# Patient Record
Sex: MALE | Race: WHITE
[De-identification: names, ages, dates, MRNs, and addresses within clinical notes are randomized per-mention and may not be internally consistent; named-entity substitution may affect disease eponyms.]

---

## 2018-11-09 ENCOUNTER — HOSPITAL ENCOUNTER (EMERGENCY)
Dept: HOSPITAL 84 - D.ER | Age: 78
Discharge: OTHER INSTITUTION | End: 2018-11-09
Payer: OTHER GOVERNMENT

## 2018-11-09 VITALS — WEIGHT: 140.29 LBS | HEIGHT: 72 IN | BODY MASS INDEX: 19 KG/M2

## 2018-11-09 VITALS — DIASTOLIC BLOOD PRESSURE: 52 MMHG | SYSTOLIC BLOOD PRESSURE: 101 MMHG

## 2018-11-09 DIAGNOSIS — A41.9: Primary | ICD-10-CM

## 2018-11-09 DIAGNOSIS — J18.9: ICD-10-CM

## 2018-11-09 DIAGNOSIS — R05: ICD-10-CM

## 2018-11-09 DIAGNOSIS — I48.91: ICD-10-CM

## 2018-11-09 LAB
ALBUMIN SERPL-MCNC: 4 G/DL (ref 3.4–5)
ALP SERPL-CCNC: 106 U/L (ref 46–116)
ALT SERPL-CCNC: 17 U/L (ref 10–68)
ANION GAP SERPL CALC-SCNC: 12.7 MMOL/L (ref 8–16)
APTT BLD: 30.8 SECONDS (ref 22.8–39.4)
BILIRUB SERPL-MCNC: 1.11 MG/DL (ref 0.2–1.3)
BUN SERPL-MCNC: 17 MG/DL (ref 7–18)
CALCIUM SERPL-MCNC: 9.5 MG/DL (ref 8.5–10.1)
CHLORIDE SERPL-SCNC: 101 MMOL/L (ref 98–107)
CK MB SERPL-MCNC: 0.9 U/L (ref 0–3.6)
CK SERPL-CCNC: 40 UL (ref 21–232)
CO2 SERPL-SCNC: 30.1 MMOL/L (ref 21–32)
CREAT SERPL-MCNC: 1 MG/DL (ref 0.6–1.3)
D DIMER PPP FEU-MCNC: 1.29 UG/MLFEU (ref 0.2–0.54)
EOSINOPHIL NFR BLD: 1 % (ref 0–7)
ERYTHROCYTE [DISTWIDTH] IN BLOOD BY AUTOMATED COUNT: 15.9 % (ref 11.5–14.5)
GLOBULIN SER-MCNC: 5.3 G/L
GLUCOSE SERPL-MCNC: 110 MG/DL (ref 74–106)
HCT VFR BLD CALC: 42.4 % (ref 42–54)
HGB BLD-MCNC: 13.7 G/DL (ref 13.5–17.5)
INR PPP: 1.23 (ref 0.85–1.17)
LYMPHOCYTES NFR BLD AUTO: 7 % (ref 15–50)
MCH RBC QN AUTO: 30.4 PG (ref 26–34)
MCHC RBC AUTO-ENTMCNC: 32.3 G/DL (ref 31–37)
MCV RBC: 94 FL (ref 80–100)
MONOCYTES NFR BLD: 8 % (ref 2–11)
NEUTROPHILS NFR BLD AUTO: 84 % (ref 40–80)
NT-PROBNP SERPL-MCNC: 3453 PG/ML (ref 0–450)
OSMOLALITY SERPL CALC.SUM OF ELEC: 281 MOSM/KG (ref 275–300)
PLAT MORPH BLD: (no result)
PLATELET # BLD EST: NORMAL 10*3/UL
PLATELET # BLD: 348 10X3/UL (ref 130–400)
PMV BLD AUTO: 12.6 FL (ref 7.4–10.4)
POTASSIUM SERPL-SCNC: 3.8 MMOL/L (ref 3.5–5.1)
PROT SERPL-MCNC: 9.3 G/DL (ref 6.4–8.2)
PROTHROMBIN TIME: 15.1 SECONDS (ref 11.6–15)
RBC # BLD AUTO: 4.51 10X6/UL (ref 4.2–6.1)
SODIUM SERPL-SCNC: 140 MMOL/L (ref 136–145)
TROPONIN I SERPL-MCNC: 0.02 NG/ML (ref 0–0.06)
WBC # BLD AUTO: 46.2 10X3/UL (ref 4.8–10.8)

## 2019-08-01 ENCOUNTER — HOSPITAL ENCOUNTER (INPATIENT)
Dept: HOSPITAL 84 - D.ER | Age: 79
LOS: 25 days | Discharge: NURSING, REHAB OR LONG-TERM CARE | DRG: 177 | End: 2019-08-26
Attending: INTERNAL MEDICINE | Admitting: INTERNAL MEDICINE
Payer: MEDICARE

## 2019-08-01 VITALS — DIASTOLIC BLOOD PRESSURE: 89 MMHG | SYSTOLIC BLOOD PRESSURE: 144 MMHG

## 2019-08-01 VITALS
WEIGHT: 114.34 LBS | BODY MASS INDEX: 15.49 KG/M2 | BODY MASS INDEX: 15.49 KG/M2 | WEIGHT: 114.34 LBS | HEIGHT: 72 IN | HEIGHT: 72 IN | BODY MASS INDEX: 15.49 KG/M2

## 2019-08-01 VITALS — DIASTOLIC BLOOD PRESSURE: 86 MMHG | SYSTOLIC BLOOD PRESSURE: 142 MMHG

## 2019-08-01 VITALS — SYSTOLIC BLOOD PRESSURE: 143 MMHG | DIASTOLIC BLOOD PRESSURE: 86 MMHG

## 2019-08-01 VITALS — SYSTOLIC BLOOD PRESSURE: 138 MMHG | DIASTOLIC BLOOD PRESSURE: 80 MMHG

## 2019-08-01 DIAGNOSIS — C38.4: ICD-10-CM

## 2019-08-01 DIAGNOSIS — I50.23: ICD-10-CM

## 2019-08-01 DIAGNOSIS — A41.9: ICD-10-CM

## 2019-08-01 DIAGNOSIS — J15.6: Primary | ICD-10-CM

## 2019-08-01 DIAGNOSIS — I48.91: ICD-10-CM

## 2019-08-01 DIAGNOSIS — E43: ICD-10-CM

## 2019-08-01 DIAGNOSIS — N17.9: ICD-10-CM

## 2019-08-01 DIAGNOSIS — I27.20: ICD-10-CM

## 2019-08-01 DIAGNOSIS — I08.1: ICD-10-CM

## 2019-08-01 DIAGNOSIS — J96.01: ICD-10-CM

## 2019-08-01 DIAGNOSIS — N40.0: ICD-10-CM

## 2019-08-01 DIAGNOSIS — K51.90: ICD-10-CM

## 2019-08-01 DIAGNOSIS — K92.1: ICD-10-CM

## 2019-08-01 DIAGNOSIS — J15.212: ICD-10-CM

## 2019-08-01 DIAGNOSIS — K59.00: ICD-10-CM

## 2019-08-01 LAB
ALBUMIN SERPL-MCNC: 2.7 G/DL (ref 3.4–5)
ALP SERPL-CCNC: 108 U/L (ref 46–116)
ALT SERPL-CCNC: 16 U/L (ref 10–68)
AMYLASE SERPL-CCNC: 58 U/L (ref 25–115)
ANION GAP SERPL CALC-SCNC: 7 MMOL/L (ref 8–16)
APPEARANCE UR: CLEAR
BILIRUB SERPL-MCNC: 0.72 MG/DL (ref 0.2–1.3)
BILIRUB SERPL-MCNC: NEGATIVE MG/DL
BUN SERPL-MCNC: 22 MG/DL (ref 7–18)
CALCIUM SERPL-MCNC: 8.3 MG/DL (ref 8.5–10.1)
CHLORIDE SERPL-SCNC: 106 MMOL/L (ref 98–107)
CO2 SERPL-SCNC: 32.9 MMOL/L (ref 21–32)
COLOR UR: YELLOW
CREAT SERPL-MCNC: 0.9 MG/DL (ref 0.6–1.3)
ELLIPTOCYTES BLD QL SMEAR: (no result)
EOSINOPHIL NFR BLD: 8 % (ref 0–7)
ERYTHROCYTE [DISTWIDTH] IN BLOOD BY AUTOMATED COUNT: 15.9 % (ref 11.5–14.5)
GLOBULIN SER-MCNC: 4.9 G/L
GLUCOSE SERPL-MCNC: 106 MG/DL (ref 74–106)
GLUCOSE SERPL-MCNC: NEGATIVE MG/DL
HCT VFR BLD CALC: 45.2 % (ref 42–54)
HGB BLD-MCNC: 14 G/DL (ref 13.5–17.5)
KETONES UR STRIP-MCNC: NEGATIVE MG/DL
LIPASE SERPL-CCNC: 191 U/L (ref 73–393)
LYMPHOCYTES NFR BLD AUTO: 3 % (ref 15–50)
MCH RBC QN AUTO: 27.3 PG (ref 26–34)
MCHC RBC AUTO-ENTMCNC: 31 G/DL (ref 31–37)
MCV RBC: 88.1 FL (ref 80–100)
MONOCYTES NFR BLD: 6 % (ref 2–11)
NEUTROPHILS NFR BLD AUTO: 79 % (ref 40–80)
NITRITE UR-MCNC: NEGATIVE MG/ML
OSMOLALITY SERPL CALC.SUM OF ELEC: 285 MOSM/KG (ref 275–300)
PH UR STRIP: 5 [PH] (ref 5–6)
PLATELET # BLD EST: (no result) 10*3/UL
PLATELET # BLD: 290 10X3/UL (ref 130–400)
PMV BLD AUTO: 13 FL (ref 7.4–10.4)
POTASSIUM SERPL-SCNC: 3.9 MMOL/L (ref 3.5–5.1)
PROT SERPL-MCNC: 7.6 G/DL (ref 6.4–8.2)
PROT UR-MCNC: (no result) MG/DL
RBC # BLD AUTO: 5.13 10X6/UL (ref 4.2–6.1)
SODIUM SERPL-SCNC: 142 MMOL/L (ref 136–145)
SP GR UR STRIP: 1.02 (ref 1–1.02)
TARGETS BLD QL SMEAR: (no result)
TROPONIN I SERPL-MCNC: 0.04 NG/ML (ref 0–0.06)
UROBILINOGEN UR-MCNC: NORMAL MG/DL
WBC # BLD AUTO: 16.4 10X3/UL (ref 4.8–10.8)

## 2019-08-01 NOTE — NUR
PT CO OF PAIN AND IS A BIT UNCOOPERATIVE SKIN WARM AND DRY IV SECURED TO LEFT
AC BEGIBN VANC AND WILL GIVE MSO4 LCTA BOWEL SOUNDS X4 BED LOW AND LOCKED
STAFF HERE ASSISTING WITH COMFORT

## 2019-08-02 VITALS — SYSTOLIC BLOOD PRESSURE: 115 MMHG | DIASTOLIC BLOOD PRESSURE: 62 MMHG

## 2019-08-02 VITALS — SYSTOLIC BLOOD PRESSURE: 121 MMHG | DIASTOLIC BLOOD PRESSURE: 55 MMHG

## 2019-08-02 VITALS — DIASTOLIC BLOOD PRESSURE: 62 MMHG | SYSTOLIC BLOOD PRESSURE: 128 MMHG

## 2019-08-02 VITALS — DIASTOLIC BLOOD PRESSURE: 88 MMHG | SYSTOLIC BLOOD PRESSURE: 109 MMHG

## 2019-08-02 VITALS — DIASTOLIC BLOOD PRESSURE: 82 MMHG | SYSTOLIC BLOOD PRESSURE: 111 MMHG

## 2019-08-02 VITALS — SYSTOLIC BLOOD PRESSURE: 108 MMHG | DIASTOLIC BLOOD PRESSURE: 71 MMHG

## 2019-08-02 LAB
ALBUMIN SERPL-MCNC: 2.6 G/DL (ref 3.4–5)
ALP SERPL-CCNC: 111 U/L (ref 46–116)
ALT SERPL-CCNC: 16 U/L (ref 10–68)
ANION GAP SERPL CALC-SCNC: 8.2 MMOL/L (ref 8–16)
APTT BLD: 31.5 SECONDS (ref 22.8–39.4)
BASOPHILS NFR BLD AUTO: 1 % (ref 0–2)
BILIRUB SERPL-MCNC: 0.83 MG/DL (ref 0.2–1.3)
BUN SERPL-MCNC: 21 MG/DL (ref 7–18)
CALCIUM SERPL-MCNC: 8.4 MG/DL (ref 8.5–10.1)
CHLORIDE SERPL-SCNC: 106 MMOL/L (ref 98–107)
CO2 SERPL-SCNC: 32.1 MMOL/L (ref 21–32)
CREAT SERPL-MCNC: 1 MG/DL (ref 0.6–1.3)
D DIMER PPP FEU-MCNC: 1.28 UG/MLFEU (ref 0.2–0.54)
EOSINOPHIL NFR BLD: 5 % (ref 0–7)
ERYTHROCYTE [DISTWIDTH] IN BLOOD BY AUTOMATED COUNT: 15.8 % (ref 11.5–14.5)
GLOBULIN SER-MCNC: 4.8 G/L
GLUCOSE SERPL-MCNC: 109 MG/DL (ref 74–106)
HCT VFR BLD CALC: 44.6 % (ref 42–54)
HGB BLD-MCNC: 13.3 G/DL (ref 13.5–17.5)
INR PPP: 1.16 (ref 0.85–1.17)
LYMPHOCYTES NFR BLD AUTO: 5 % (ref 15–50)
MCH RBC QN AUTO: 27.1 PG (ref 26–34)
MCHC RBC AUTO-ENTMCNC: 29.8 G/DL (ref 31–37)
MCV RBC: 91 FL (ref 80–100)
MONOCYTES NFR BLD: 15 % (ref 2–11)
NEUTROPHILS NFR BLD AUTO: 72 % (ref 40–80)
OSMOLALITY SERPL CALC.SUM OF ELEC: 286 MOSM/KG (ref 275–300)
PLAT MORPH BLD: (no result)
PLATELET # BLD EST: NORMAL 10*3/UL
PLATELET # BLD: 238 10X3/UL (ref 130–400)
PMV BLD AUTO: 12.8 FL (ref 7.4–10.4)
POTASSIUM SERPL-SCNC: 4.3 MMOL/L (ref 3.5–5.1)
PROT SERPL-MCNC: 7.4 G/DL (ref 6.4–8.2)
PROTHROMBIN TIME: 14.3 SECONDS (ref 11.6–15)
RBC # BLD AUTO: 4.9 10X6/UL (ref 4.2–6.1)
ROULEAUX BLD QL SMEAR: (no result)
SODIUM SERPL-SCNC: 142 MMOL/L (ref 136–145)
WBC # BLD AUTO: 17.9 10X3/UL (ref 4.8–10.8)

## 2019-08-02 NOTE — NUR
PATIENT TO REFUSE THE SCD'S AS HE DOES NOT WANT A MACHINE ON IT, EVEN THOUGH I
HAVE EXPLAINED THE USE FOR THEM.

## 2019-08-02 NOTE — NUR
ROUNDING DONE WITH PATIENT HAVING NO NEEDS VOICED AT THIS TIME. ON HEART
MONITOR SHOWING ST W OCC PVC, . ON 2L PER NC. LEFT AC PIV SEEN WITH NS
INFUSING AT 125CC/HR. NO SCD'S ON AT THIS TIME, WILL ASK PAITENT IF HE WILL
WEAR THEM AND CHART RESPONSE.

## 2019-08-02 NOTE — NUR
REPORT RECIEVED AND ROUNDING COMPLETE. PATIENT LAYING IN BED IN SUPINE
POSITON, PATIENT WAS WONDERING WHEN THE MEDICATION IS GOING TO KICK IN THAT
THEY GAVE HIM TO HAVE A BOWEL MOVEMENT. PATIENT HAS A LEFT AC THAT IS SALINE
LOCKED AT THIS TIME. PATIENT IS ON 2L OF O2 VIA NASAL CANNULA. PATIENT HAS
REFUSED SCD'S AT THIS TIME. PATIENT IS SHOWING NO S/SX OF DISTRESS. PATIENT
STATES HE HAS NO NEEDS AT THIS TIME. CALL LIGHT WITHIN REACH AND BED IN LOWEST
POSITION.

## 2019-08-03 VITALS — DIASTOLIC BLOOD PRESSURE: 64 MMHG | SYSTOLIC BLOOD PRESSURE: 110 MMHG

## 2019-08-03 VITALS — SYSTOLIC BLOOD PRESSURE: 121 MMHG | DIASTOLIC BLOOD PRESSURE: 71 MMHG

## 2019-08-03 VITALS — DIASTOLIC BLOOD PRESSURE: 88 MMHG | SYSTOLIC BLOOD PRESSURE: 128 MMHG

## 2019-08-03 VITALS — DIASTOLIC BLOOD PRESSURE: 87 MMHG | SYSTOLIC BLOOD PRESSURE: 108 MMHG

## 2019-08-03 VITALS — DIASTOLIC BLOOD PRESSURE: 66 MMHG | SYSTOLIC BLOOD PRESSURE: 119 MMHG

## 2019-08-03 LAB
ANION GAP SERPL CALC-SCNC: 8.8 MMOL/L (ref 8–16)
BUN SERPL-MCNC: 20 MG/DL (ref 7–18)
CALCIUM SERPL-MCNC: 8.3 MG/DL (ref 8.5–10.1)
CHLORIDE SERPL-SCNC: 103 MMOL/L (ref 98–107)
CO2 SERPL-SCNC: 32.6 MMOL/L (ref 21–32)
CREAT SERPL-MCNC: 1 MG/DL (ref 0.6–1.3)
EOSINOPHIL NFR BLD: 6 % (ref 0–7)
ERYTHROCYTE [DISTWIDTH] IN BLOOD BY AUTOMATED COUNT: 15.9 % (ref 11.5–14.5)
GLUCOSE SERPL-MCNC: 79 MG/DL (ref 74–106)
HCT VFR BLD CALC: 43.7 % (ref 42–54)
HGB BLD-MCNC: 13 G/DL (ref 13.5–17.5)
LYMPHOCYTES NFR BLD AUTO: 8 % (ref 15–50)
MCH RBC QN AUTO: 26.6 PG (ref 26–34)
MCHC RBC AUTO-ENTMCNC: 29.7 G/DL (ref 31–37)
MCV RBC: 89.5 FL (ref 80–100)
MONOCYTES NFR BLD: 11 % (ref 2–11)
NEUTROPHILS NFR BLD AUTO: 73 % (ref 40–80)
OSMOLALITY SERPL CALC.SUM OF ELEC: 280 MOSM/KG (ref 275–300)
PLAT MORPH BLD: (no result)
PLATELET # BLD EST: NORMAL 10*3/UL
PLATELET # BLD: 300 10X3/UL (ref 130–400)
PMV BLD AUTO: 12.7 FL (ref 7.4–10.4)
POTASSIUM SERPL-SCNC: 4.4 MMOL/L (ref 3.5–5.1)
RBC # BLD AUTO: 4.88 10X6/UL (ref 4.2–6.1)
SODIUM SERPL-SCNC: 140 MMOL/L (ref 136–145)
WBC # BLD AUTO: 22.4 10X3/UL (ref 4.8–10.8)

## 2019-08-03 NOTE — NUR
PT REFUSED EVENING LOVENOX, PROVIDED PT EDUCATION ON PURPOSE OF MEDICATION AND
PT CONTINUES TO REFUSE. PT THEN STATED "IF I DIE JUST ROLL ME ON DOWN TO THE
FURNACE". WILL CTM.

## 2019-08-03 NOTE — NUR
EVENING ROUNDS COMPLETED. REPORT RECEIVED. PT SITTING UP IN BED WITH EYES
OPEN, RR EVEN AND UNLABORED. OXYGEN AT 2 LITERS BY NASAL CANNULA. BED IN LOW
POSITION. NO S/S OF DISTRESS NOTED. 80 CONTROLLED AFIB ON TELEMETRY.
INTRODUCED SELF TO PT. PT DENIES FURTHER NEEDS AT THIS TIME. CALL LIGHT IN
REACH. WILL CTM.

## 2019-08-03 NOTE — NUR
PATIENT REPOTS THAT HE HAS HAD A DRY HACKING COUGH AND HE WAS UP ALL NIGHT
BECAUSE OF IT. HE SATES THAT HE DOES NOT WANT HIS NASAL SPRAYS BECASUE HE HAS
A TUBE IN HIS RIGHT NOSTRIL THAT GOES TO HIS EAR, AND WHEN ANY SPRAY GOES IN
THERE IT BOTHERS HIM. I OFFERED TO USE THE LEFT NARE ONLY, HOWEVER HE SAID"NO,
HE JUST DOES NOT WANT ANYTHING IN HIS NOSE."
HE GOT TO THE BATHROOM WITH STAND BY ASSISTANCE, AND HAD SOME BLEEDING FROM
HIS HEMMORROIDS. HE REPORTS THAT IS NORMAL FOR HIM AND HAS BEEN HAPPENING FOR
YEARS. HE IS RESTING IN BED AT THIS TIME, AND STATES HE IS FRUSTRATED AND
WANTS COUGH SYRUP. HE ALSO REQUEST THAT HE TAKE HIS FLOMAX IN THE EVENING.

## 2019-08-04 VITALS — DIASTOLIC BLOOD PRESSURE: 72 MMHG | SYSTOLIC BLOOD PRESSURE: 136 MMHG

## 2019-08-04 VITALS — SYSTOLIC BLOOD PRESSURE: 122 MMHG | DIASTOLIC BLOOD PRESSURE: 70 MMHG

## 2019-08-04 VITALS — DIASTOLIC BLOOD PRESSURE: 72 MMHG | SYSTOLIC BLOOD PRESSURE: 126 MMHG

## 2019-08-04 VITALS — DIASTOLIC BLOOD PRESSURE: 69 MMHG | SYSTOLIC BLOOD PRESSURE: 126 MMHG

## 2019-08-04 LAB
ANION GAP SERPL CALC-SCNC: 6.7 MMOL/L (ref 8–16)
BASOPHILS NFR BLD AUTO: 0.2 % (ref 0–2)
BUN SERPL-MCNC: 19 MG/DL (ref 7–18)
CALCIUM SERPL-MCNC: 8 MG/DL (ref 8.5–10.1)
CHLORIDE SERPL-SCNC: 104 MMOL/L (ref 98–107)
CO2 SERPL-SCNC: 33.9 MMOL/L (ref 21–32)
CREAT SERPL-MCNC: 0.9 MG/DL (ref 0.6–1.3)
EOSINOPHIL NFR BLD: 6.1 % (ref 0–7)
ERYTHROCYTE [DISTWIDTH] IN BLOOD BY AUTOMATED COUNT: 15.7 % (ref 11.5–14.5)
GLUCOSE SERPL-MCNC: 105 MG/DL (ref 74–106)
HCT VFR BLD CALC: 41.1 % (ref 42–54)
HGB BLD-MCNC: 12.6 G/DL (ref 13.5–17.5)
IMM GRANULOCYTES NFR BLD: 0.4 % (ref 0–5)
LYMPHOCYTES NFR BLD AUTO: 2.7 % (ref 15–50)
MCH RBC QN AUTO: 26.6 PG (ref 26–34)
MCHC RBC AUTO-ENTMCNC: 30.7 G/DL (ref 31–37)
MCV RBC: 86.9 FL (ref 80–100)
MONOCYTES NFR BLD: 16.3 % (ref 2–11)
NEUTROPHILS NFR BLD AUTO: 74.3 % (ref 40–80)
OSMOLALITY SERPL CALC.SUM OF ELEC: 282 MOSM/KG (ref 275–300)
PLATELET # BLD: 288 10X3/UL (ref 130–400)
PMV BLD AUTO: 12.5 FL (ref 7.4–10.4)
POTASSIUM SERPL-SCNC: 3.6 MMOL/L (ref 3.5–5.1)
RBC # BLD AUTO: 4.73 10X6/UL (ref 4.2–6.1)
SODIUM SERPL-SCNC: 141 MMOL/L (ref 136–145)
WBC # BLD AUTO: 18.8 10X3/UL (ref 4.8–10.8)

## 2019-08-04 NOTE — NUR
PT REFUSES EVENING MEDICATIONS INCLUDING LOVENOX. EDUCATED PT ON RISK OF NOT
TAKING PRESCRIBED ANTICOAGULANTS. PT STATES UNDERSTANDING.

## 2019-08-04 NOTE — NUR
EVENING ROUNDS COMPLETED. REPORT RECEIVED. PT SITTING UP IN BED WITH EYES
OPEN, RR EVEN AND UNLABORED. BED IN LOW POSITION. NO S/S OF DISTRESS NOTED.
INTRODUCED SELF TO PT. PT DENIES FURTHER NEEDS AT THIS TIME. 90 CONTROLLED
AFIB ON TELEMETRY. OXYGEN AT 2 LITERS BY NASAL CANNULA. CALL LIGHT IN REACH.
WILL CTM.

## 2019-08-05 VITALS — SYSTOLIC BLOOD PRESSURE: 137 MMHG | DIASTOLIC BLOOD PRESSURE: 92 MMHG

## 2019-08-05 VITALS — DIASTOLIC BLOOD PRESSURE: 90 MMHG | SYSTOLIC BLOOD PRESSURE: 140 MMHG

## 2019-08-05 VITALS — DIASTOLIC BLOOD PRESSURE: 74 MMHG | SYSTOLIC BLOOD PRESSURE: 152 MMHG

## 2019-08-05 VITALS — SYSTOLIC BLOOD PRESSURE: 127 MMHG | DIASTOLIC BLOOD PRESSURE: 70 MMHG

## 2019-08-05 VITALS — DIASTOLIC BLOOD PRESSURE: 93 MMHG | SYSTOLIC BLOOD PRESSURE: 144 MMHG

## 2019-08-05 LAB
ANION GAP SERPL CALC-SCNC: 7.6 MMOL/L (ref 8–16)
BASOPHILS NFR BLD AUTO: 0.1 % (ref 0–2)
BUN SERPL-MCNC: 16 MG/DL (ref 7–18)
CALCIUM SERPL-MCNC: 7.9 MG/DL (ref 8.5–10.1)
CHLORIDE SERPL-SCNC: 105 MMOL/L (ref 98–107)
CO2 SERPL-SCNC: 33.3 MMOL/L (ref 21–32)
CREAT SERPL-MCNC: 0.8 MG/DL (ref 0.6–1.3)
EOSINOPHIL NFR BLD: 1 % (ref 0–7)
EOSINOPHIL NFR BLD: 7.7 % (ref 0–7)
ERYTHROCYTE [DISTWIDTH] IN BLOOD BY AUTOMATED COUNT: 15.7 % (ref 11.5–14.5)
ERYTHROCYTE [DISTWIDTH] IN BLOOD BY AUTOMATED COUNT: 16 % (ref 11.5–14.5)
GLUCOSE SERPL-MCNC: 91 MG/DL (ref 74–106)
HCT VFR BLD CALC: 42.7 % (ref 42–54)
HCT VFR BLD CALC: 44.2 % (ref 42–54)
HGB BLD-MCNC: 13.2 G/DL (ref 13.5–17.5)
HGB BLD-MCNC: 13.9 G/DL (ref 13.5–17.5)
IMM GRANULOCYTES NFR BLD: 0.5 % (ref 0–5)
LYMPHOCYTES NFR BLD AUTO: 5 % (ref 15–50)
LYMPHOCYTES NFR BLD AUTO: 6.2 % (ref 15–50)
MCH RBC QN AUTO: 26.4 PG (ref 26–34)
MCH RBC QN AUTO: 27.4 PG (ref 26–34)
MCHC RBC AUTO-ENTMCNC: 30.9 G/DL (ref 31–37)
MCHC RBC AUTO-ENTMCNC: 31.4 G/DL (ref 31–37)
MCV RBC: 85.4 FL (ref 80–100)
MCV RBC: 87 FL (ref 80–100)
MONOCYTES NFR BLD: 12 % (ref 2–11)
MONOCYTES NFR BLD: 12.7 % (ref 2–11)
NEUTROPHILS NFR BLD AUTO: 72.8 % (ref 40–80)
NEUTROPHILS NFR BLD AUTO: 82 % (ref 40–80)
OSMOLALITY SERPL CALC.SUM OF ELEC: 283 MOSM/KG (ref 275–300)
PLATELET # BLD EST: NORMAL 10*3/UL
PLATELET # BLD: 292 10X3/UL (ref 130–400)
PLATELET # BLD: 302 10X3/UL (ref 130–400)
PMV BLD AUTO: 12.6 FL (ref 7.4–10.4)
PMV BLD AUTO: 12.9 FL (ref 7.4–10.4)
POTASSIUM SERPL-SCNC: 3.9 MMOL/L (ref 3.5–5.1)
RBC # BLD AUTO: 5.01 10X6/UL (ref 4.2–6.1)
RBC # BLD AUTO: 5.08 10X6/UL (ref 4.2–6.1)
SODIUM SERPL-SCNC: 142 MMOL/L (ref 136–145)
WBC # BLD AUTO: 18.1 10X3/UL (ref 4.8–10.8)
WBC # BLD AUTO: 18.4 10X3/UL (ref 4.8–10.8)

## 2019-08-05 NOTE — NUR
PATIENT REFUSING ALL MEDS AGAIN. STATES VARIOUS REASONS INCLUDING"DON'T WORK"
ETC. SPENT SEVERAL MINUTES EDUCATING PATIENT ON MEDS AND DR ADVICE. PATIENT
STILL REFUSES. NOTED ON EMAR. WILL CONTINUE TO MONITOR. SR UP X 2 BED IN LOW
POSITION AND CALL LIGHT INREACH

## 2019-08-05 NOTE — NUR
PATIENT ASKING FOR PRUNES TO HELP BOWELS MOVE. PUT IN DIETARY REQUEST.
EXPLAINED TO PATIENT REGARDING MIRALAX BENFITS AND HIS EARLIER REFUSAL.
PATIENT AGREED TO TAKE. WILL CONTINUE TO MONITOR.

## 2019-08-05 NOTE — NUR
PATIENT RESTING CONFORTABLY WITH EYES CLOSED AND BREATHING EVENLY. WILL
CONTINUE TO MONITOR. SR UP X 2 BED IN LOW POSITION AND CALL LIGHT IN REACH.

## 2019-08-05 NOTE — NUR
REPORT RECIEVED FROM NIGHT SHIFT AND PATIENT CARE ASSUMED. PATIENT LAYING IN
BED AWAKE, ALERT AND ORIENTED X 4. O2 PER NC. PATIENT HAS NO IV. VASCULAR
ACCESS CONSULT IN PLACE. PATIENT IS STABLE AND VSS. PATIENT DENIES ANY NEEDS
OR PAIN. WILL CONTINUE WITH PLAN OF CARE. SR UP X 2 BED IN LOW POSITION AND
CALL LIGHT IN REACH.

## 2019-08-05 NOTE — NUR
Calorie Count for Sunday August 4:
                 kcal           protein
Breakfast        236              14
Lunch            550              29
Dinner           780              44
                ____             ____
total           1566 kcal         87 gm protein
 
Total kcals include 2 Ensure nutritional supplements.
RDN following.

## 2019-08-06 VITALS — SYSTOLIC BLOOD PRESSURE: 130 MMHG | DIASTOLIC BLOOD PRESSURE: 80 MMHG

## 2019-08-06 VITALS — SYSTOLIC BLOOD PRESSURE: 138 MMHG | DIASTOLIC BLOOD PRESSURE: 74 MMHG

## 2019-08-06 VITALS — SYSTOLIC BLOOD PRESSURE: 146 MMHG | DIASTOLIC BLOOD PRESSURE: 83 MMHG

## 2019-08-06 VITALS — SYSTOLIC BLOOD PRESSURE: 138 MMHG | DIASTOLIC BLOOD PRESSURE: 78 MMHG

## 2019-08-06 VITALS — SYSTOLIC BLOOD PRESSURE: 126 MMHG | DIASTOLIC BLOOD PRESSURE: 83 MMHG

## 2019-08-06 LAB
ANION GAP SERPL CALC-SCNC: 6.7 MMOL/L (ref 8–16)
BASOPHILS NFR BLD AUTO: 0.2 % (ref 0–2)
BUN SERPL-MCNC: 13 MG/DL (ref 7–18)
CALCIUM SERPL-MCNC: 8.1 MG/DL (ref 8.5–10.1)
CHLORIDE SERPL-SCNC: 106 MMOL/L (ref 98–107)
CO2 SERPL-SCNC: 32.9 MMOL/L (ref 21–32)
CREAT SERPL-MCNC: 0.9 MG/DL (ref 0.6–1.3)
EOSINOPHIL NFR BLD: 8.7 % (ref 0–7)
ERYTHROCYTE [DISTWIDTH] IN BLOOD BY AUTOMATED COUNT: 15.9 % (ref 11.5–14.5)
GLUCOSE SERPL-MCNC: 93 MG/DL (ref 74–106)
HCT VFR BLD CALC: 43.5 % (ref 42–54)
HGB BLD-MCNC: 13.3 G/DL (ref 13.5–17.5)
IMM GRANULOCYTES NFR BLD: 0.7 % (ref 0–5)
LYMPHOCYTES NFR BLD AUTO: 3.4 % (ref 15–50)
MCH RBC QN AUTO: 26.5 PG (ref 26–34)
MCHC RBC AUTO-ENTMCNC: 30.6 G/DL (ref 31–37)
MCV RBC: 86.7 FL (ref 80–100)
MONOCYTES NFR BLD: 17.1 % (ref 2–11)
NEUTROPHILS NFR BLD AUTO: 69.9 % (ref 40–80)
OSMOLALITY SERPL CALC.SUM OF ELEC: 282 MOSM/KG (ref 275–300)
PLATELET # BLD: 293 10X3/UL (ref 130–400)
PMV BLD AUTO: 12.7 FL (ref 7.4–10.4)
POTASSIUM SERPL-SCNC: 3.6 MMOL/L (ref 3.5–5.1)
RBC # BLD AUTO: 5.02 10X6/UL (ref 4.2–6.1)
SODIUM SERPL-SCNC: 142 MMOL/L (ref 136–145)
WBC # BLD AUTO: 15.7 10X3/UL (ref 4.8–10.8)

## 2019-08-06 NOTE — NUR
PATIENT LAYING IN BED. PATIENT COMPLAINS OF "HUNGER PAIN", DAY SHIFT NURSE
GAVE SHEYLA PRN. NO OTHER COMPLAINTS AT THIS TIME. NO DISTRESS NOTED.

## 2019-08-06 NOTE — NUR
Nutrition Follow-up:
Tolerating PO intake. Calorie count done; entered 8/5. Noted Miralax decreased
to 1x daily 2/2 fullness. Drinking Ensure ~2/day.
Diet: Regular
Last BM: 8/6
Labs reviewed
Meds reviewed
Pt with severe malnutrition of chronic illness R/T unknown etiology AEB:
1. >20% wt loss in past yr
2. subcutaneous fat loss; per chart review, MD noted cachexia
Rec continue current diet as tolerated. Honor food preferences. RD following.

## 2019-08-06 NOTE — MORECARE
CASE MANAGEMENT DISCHARGE SUMMARY
 
 
PATIENT: NEVILLE ESTEBAN                        UNIT: [id number]
ACCOUNT#: [id number]                       ADM DATE: 19
AGE: 79     : 40  SEX: M            ROOM/BED: D.2132    
AUTHOR: ANA TOBAR                             PHYSICIAN:                               
 
REFERRING PHYSICIAN: GARRISON BECERRA MD               
DATE OF SERVICE: 19
Discharge Plan
 
 
Patient Name: NEVILLE ESTEBAN
Facility: [facility]:[place]
Encounter #: [id number]
Medical Record #: [id number]
: 1940
Planned Disposition: Skilled Nursing Facility
Anticipated Discharge Date: 
 
Discharge Date: 
Expected LOS: 
Initial Reviewer: [email or web address]
Initial Review Date: 2019
Generated: 19   3:59 pm 
 DCPIA - Discharge Planning Initial Assessment
 
Updated by [email or web address]: Migel Vallecillo on 19   2:58 pm
*  Is the patient Alert and Oriented?
Yes
*  How many steps to enter\exit or inside your home? ELEVATOR *  PCP DR. MEJIA - [facility]
*  Pharmacy
[facility] MAIL ORDER
 
*  Preadmission Environment
Home Alone
*  ADLs
Independent
*  Equipment
Walker
*  Other Equipment
[employer] PROVIDER
*  List name and contact numbers for known caregivers / representatives who 
currently or will assist patient after discharge:
RANULFO BLANCA, FRIEND, [phone]
*  Additional services required to return to the preadmission environment?
No
*  Can the patient safely return to the preadmission environment?
 
No
*  Has this patient been hospitalized within the prior 30 days at any 
hospital?
No
 
 
 
 
 
 
Patient Name: NEVILLE ESTEBAN
Encounter #: [id number]
Page [id number]
 
 
 
 
 
Electronically Signed by ANA TOBAR on 19 at 1459
 
 
 
 
 
 
**All edits/amendments must be made on the electronic document**
 
DICTATION DATE: 19     : KERON  19 0833     
RPT#: [id number]                                [place] DATE:        
                                               STATUS: ADM IN  
[facility]
 [place] [address]
***END OF REPORT***

## 2019-08-06 NOTE — NUR
PT HAS REFUSED ALL AM MEDICATIONS STATING "ESTHELA GIVEN THOSE UP, THE ONLY THING
IM GOING TO TAKE IS THE MIRILAX THAT IS DUE TONIGHT." IN SPITE OF INSTRUCTING
PT ON THE IMPORTANCE OF THESE MEDICATIONS, HE CONTINUED TO REFUSE. WILL NOTIFY
MD.

## 2019-08-06 NOTE — MORECARE
CASE MANAGEMENT DISCHARGE SUMMARY
 
 
PATIENT: NEVILLE ESTEBAN                        UNIT: [id number]
ACCOUNT#: [id number]                       ADM DATE: 19
AGE: 79     : 40  SEX: M            ROOM/BED: D.2132    
AUTHOR: AMADOU,DOC                             PHYSICIAN:                               
 
REFERRING PHYSICIAN: GARRISON BECERRA MD               
DATE OF SERVICE: 19
Discharge Plan
 
 
Patient Name: NEVILLE ESTEBAN
Facility: [facility]:[place]
Encounter #: [id number]
Medical Record #: [id number]
: 1940
Planned Disposition: Skilled Nursing Facility
Anticipated Discharge Date: 
 
Discharge Date: 
Expected LOS: 
Initial Reviewer: [email or web address]
Initial Review Date: 2019
Generated: 19   4:09 pm 
Comments
 
DCP- Discharge Planning
 
Updated by [email or web address]: Migel Vallecillo on 19   2:05 pm CT
Patient Name: NEVILLE ESTEBAN                                     
Admission Status: ER   
Accout number: [id number]                              
Admission Date: 2019   
: 1940                                                        
Admission Diagnosis:UNSPECIFIED ABDOMINAL PAIN   
Attending: GARRISON BECERRA                                                
Current LOS:  5   
  
Anticipated DC Date:    
Planned Disposition: Skilled Nursing Facility   
Primary Insurance: MEDICARE PART A ONLY   
PLANNED EXTERNAL PROVIDER :  THE [facility] NURSING AND REHAB, MEDICARE REHAB BED 
  
Discharge Planning Comments:   
CM RECEIVED REQUEST FROM PT THROUGH BEDSIDE NURSE TO SPEAK TO PT REGARDING 
DISCHARGE PLANNING.  CM MET WITH PT IN ROOM TO DISCUSS DISCHARGE PLANNING AND 
NEEDS. PT REPORTS LIVING AT HOME INDEPENDENTLY AND ALONE.  [facility]  PT HAS HAD NO OUTSIDE SERVICES ASSISTING IN THE HOME. CM 
 
DISCUSSED AVAILABILITY OF HOME HEALTH, REHAB SERVICES AND MEDICAL EQUIPMENT. 
PT REPORTS HE IS NOT ABLE TO TAKE CARE OF HIMSELF ANY LONGER.  PT WANTS CM TO 
PLACE HIM SOMEWHERE FOR LONG TERM CARE.  PT REPORTS HE IS NOT ABLE TO GET OUT 
OF BED WITHOUT ASSISTANCE.  PT RECEIVES $1030 IN SOCIAL SECURITY AND $130 IN 
[employer] DIS[employer] MONTHLY.  PT WOULD ONLY GO TO A [facility] HOME AS A LAST RESORT 
AND IS ONLY 10% SERVICE CONNECTED WITH DISABILITY.  PT DENIES HAVING ANY 
OTHER ASSETS.  PT LIN WITH [employer] IN [place].  PT REPORTS BEING PAID UP 
FOR THE REST OF THE MONTH'S RENT AT THE [place] HOUSING AUTHORITY 
[facility], APARTMENT [address].  CM DISCUSSED SKILLED NURSING FACILITY FOR REHAB, PT 
REPORTS WILLINGNESS FOR REHAB BUT STATES HE NEEDS LONG TERM CARE TOO.  
LISTING OF NURSING HOMES PROVIDED, PT SIGNED CONSENT FOR NO SKILLED NURSING 
FACILITY PREFERENCE.  IMPORTANT MESSAGE FROM MEDICARE PROVIDED AND EXPLAINED. 
  
CM NOTIFIED [facility] OF REHAB AND LONG TERM CARE REFERRAL, 
[phone].  CM FAXED REFERRAL TO [facility] [phone].  
  
CM WAITING ADMISSION DETERMINATION FROM THE [facility] NURSING AND REHAB FOR REHAB 
AND LONG TERM CARE PLACEMENT.  
  
: Migel Vallecillo
 DCPIA - Discharge Planning Initial Assessment
 
Updated by [email or web address]: Migel Vallecillo on 19   2:58 pm
*  Is the patient Alert and Oriented?
Yes
*  How many steps to enter\exit or inside your home? ELEVATOR *  PCP DR. MEJIA - [facility]
*  Pharmacy
[facility] MAIL ORDER
 
*  Preadmission Environment
Home Alone
*  ADLs
Independent
*  Equipment
Walker
*  Other Equipment
[employer] PROVIDER
*  List name and contact numbers for known caregivers / representatives who 
currently or will assist patient after discharge:
RANULFO BLANCA, FRIEND, [phone]
*  Additional services required to return to the preadmission environment?
No
*  Can the patient safely return to the preadmission environment?
No
*  Has this patient been hospitalized within the prior 30 days at any 
hospital?
No
 
External Providers
[facility] [place]
 
 
Next Contact Date: 2019
Service Request Date: 
Service Type: 
Resolution: 
 
Reviewer: 
Comments: 
 
 
 
 
Coverage Notice
 
Reviewer: [id number] Amanda Vallecillo
 
Notice Issued Date-Time: 2019  13:30
Notice Type: IM Discharge Notice
 
Notice Delivered To: Patient
Relationship to Patient: 
Representative Name: 
 
Delivery Method: HAND - Hand Delivered
Emili Days:
Prior Verbal Notification: 
 
Recipient Understood Notice: Yes
Recipient Signature: Yes
Med Rec Note Co-signed by Attending:
 
Coverage Notice Comment:  
Reviewer: [id number] Amanda Vallecillo
 
Notice Issued Date-Time: 2019  13:30
Notice Type: Patient Choice Letter
 
Notice Delivered To: Patient
Relationship to Patient: 
Representative Name: 
 
Delivery Method: HAND - Hand Delivered
Emili Days:
Prior Verbal Notification: 
 
Recipient Understood Notice: Yes
Recipient Signature: Yes
Med Rec Note Co-signed by Attending:
 
Coverage Notice Comment:  NO SKILLED NURSING FACILITY PREFERENCE
 
Last DP export: 19   1:59 pm
Patient Name: NEVILLE ESTEBAN
 
Encounter #: [id number]
Page [id number]
 
 
 
 
 
Electronically Signed by ANA TOBAR on 19 at 1510
 
 
 
 
 
 
**All edits/amendments must be made on the electronic document**
 
DICTATION DATE: 19     : KERON  19     
RPT#: [id number]                                DC DATE:        
                                               STATUS: ADM IN  
[facility]
 [place] [address]
***END OF REPORT***

## 2019-08-06 NOTE — NUR
REPORT RECEIVED. WILL CONTINUE WITH POC. PT CURRENTLY LYING SEMI FOWLERS. CALL
LIGHT W/I REACH. FAMILY AT BEDSIDE. RR EVEN AND UNLABORED ON 3L 02. L.FOR PIV
IS SALINE LOCKED. PT DENIES ANY NEEDS AT THIS TIME. NO S/S OF DISTRESS NOTED.
WILL CTM.

## 2019-08-07 VITALS — DIASTOLIC BLOOD PRESSURE: 66 MMHG | SYSTOLIC BLOOD PRESSURE: 126 MMHG

## 2019-08-07 VITALS — DIASTOLIC BLOOD PRESSURE: 78 MMHG | SYSTOLIC BLOOD PRESSURE: 132 MMHG

## 2019-08-07 VITALS — SYSTOLIC BLOOD PRESSURE: 114 MMHG | DIASTOLIC BLOOD PRESSURE: 78 MMHG

## 2019-08-07 VITALS — DIASTOLIC BLOOD PRESSURE: 76 MMHG | SYSTOLIC BLOOD PRESSURE: 128 MMHG

## 2019-08-07 VITALS — DIASTOLIC BLOOD PRESSURE: 93 MMHG | SYSTOLIC BLOOD PRESSURE: 145 MMHG

## 2019-08-07 VITALS — DIASTOLIC BLOOD PRESSURE: 81 MMHG | SYSTOLIC BLOOD PRESSURE: 125 MMHG

## 2019-08-07 LAB
ANION GAP SERPL CALC-SCNC: 6.6 MMOL/L (ref 8–16)
BASOPHILS NFR BLD AUTO: 0.2 % (ref 0–2)
BUN SERPL-MCNC: 12 MG/DL (ref 7–18)
CALCIUM SERPL-MCNC: 8.1 MG/DL (ref 8.5–10.1)
CHLORIDE SERPL-SCNC: 106 MMOL/L (ref 98–107)
CO2 SERPL-SCNC: 32.2 MMOL/L (ref 21–32)
CREAT SERPL-MCNC: 0.8 MG/DL (ref 0.6–1.3)
EOSINOPHIL NFR BLD: 8.5 % (ref 0–7)
ERYTHROCYTE [DISTWIDTH] IN BLOOD BY AUTOMATED COUNT: 16 % (ref 11.5–14.5)
GLUCOSE SERPL-MCNC: 89 MG/DL (ref 74–106)
HCT VFR BLD CALC: 44 % (ref 42–54)
HGB BLD-MCNC: 13.4 G/DL (ref 13.5–17.5)
IMM GRANULOCYTES NFR BLD: 0.8 % (ref 0–5)
LYMPHOCYTES NFR BLD AUTO: 3.3 % (ref 15–50)
MAGNESIUM SERPL-MCNC: 1.9 MG/DL (ref 1.8–2.4)
MCH RBC QN AUTO: 26.6 PG (ref 26–34)
MCHC RBC AUTO-ENTMCNC: 30.5 G/DL (ref 31–37)
MCV RBC: 87.3 FL (ref 80–100)
MONOCYTES NFR BLD: 16.4 % (ref 2–11)
NEUTROPHILS NFR BLD AUTO: 70.8 % (ref 40–80)
OSMOLALITY SERPL CALC.SUM OF ELEC: 279 MOSM/KG (ref 275–300)
PHOSPHATE SERPL-MCNC: 3.6 MG/DL (ref 2.5–4.9)
PLATELET # BLD: 295 10X3/UL (ref 130–400)
PMV BLD AUTO: 12.8 FL (ref 7.4–10.4)
POTASSIUM SERPL-SCNC: 3.8 MMOL/L (ref 3.5–5.1)
RBC # BLD AUTO: 5.04 10X6/UL (ref 4.2–6.1)
SODIUM SERPL-SCNC: 141 MMOL/L (ref 136–145)
WBC # BLD AUTO: 16.5 10X3/UL (ref 4.8–10.8)

## 2019-08-07 NOTE — NUR
OT NOTE: ATTMEPTED EVAL IN AM. PT SLIGHTLY AGITATED AND REFUSED THERAPY AT
THIS TIME. WILL RE ATTEMPT LATER.
SHREYA COURTNEY, OTR/L

## 2019-08-07 NOTE — NUR
REPORT RECIEVED. PT LYING SEMI FOWLERS. ON 3L NC. L FA PIV INFUSING NS@KVO. RR
EVEN AND UNLABORED. NO DISTRESS NOTED. BED IN LOWEST POSITION AND CALL LIGHT
WITHIN REACH. WILL CTM

## 2019-08-08 VITALS — DIASTOLIC BLOOD PRESSURE: 70 MMHG | SYSTOLIC BLOOD PRESSURE: 123 MMHG

## 2019-08-08 VITALS — DIASTOLIC BLOOD PRESSURE: 77 MMHG | SYSTOLIC BLOOD PRESSURE: 122 MMHG

## 2019-08-08 VITALS — DIASTOLIC BLOOD PRESSURE: 74 MMHG | SYSTOLIC BLOOD PRESSURE: 117 MMHG

## 2019-08-08 VITALS — SYSTOLIC BLOOD PRESSURE: 126 MMHG | DIASTOLIC BLOOD PRESSURE: 78 MMHG

## 2019-08-08 VITALS — SYSTOLIC BLOOD PRESSURE: 110 MMHG | DIASTOLIC BLOOD PRESSURE: 73 MMHG

## 2019-08-08 VITALS — DIASTOLIC BLOOD PRESSURE: 66 MMHG | SYSTOLIC BLOOD PRESSURE: 115 MMHG

## 2019-08-08 LAB
ANION GAP SERPL CALC-SCNC: 9.9 MMOL/L (ref 8–16)
BUN SERPL-MCNC: 18 MG/DL (ref 7–18)
CALCIUM SERPL-MCNC: 8.4 MG/DL (ref 8.5–10.1)
CHLORIDE SERPL-SCNC: 104 MMOL/L (ref 98–107)
CO2 SERPL-SCNC: 32.3 MMOL/L (ref 21–32)
CREAT SERPL-MCNC: 0.9 MG/DL (ref 0.6–1.3)
EOSINOPHIL NFR BLD: 3 % (ref 0–7)
ERYTHROCYTE [DISTWIDTH] IN BLOOD BY AUTOMATED COUNT: 16.3 % (ref 11.5–14.5)
GLUCOSE SERPL-MCNC: 89 MG/DL (ref 74–106)
HCT VFR BLD CALC: 44.2 % (ref 42–54)
HGB BLD-MCNC: 13.4 G/DL (ref 13.5–17.5)
LYMPHOCYTES NFR BLD AUTO: 9 % (ref 15–50)
MAGNESIUM SERPL-MCNC: 1.8 MG/DL (ref 1.8–2.4)
MCH RBC QN AUTO: 27.2 PG (ref 26–34)
MCHC RBC AUTO-ENTMCNC: 30.3 G/DL (ref 31–37)
MCV RBC: 89.8 FL (ref 80–100)
MONOCYTES NFR BLD: 19 % (ref 2–11)
NEUTROPHILS NFR BLD AUTO: 65 % (ref 40–80)
OSMOLALITY SERPL CALC.SUM OF ELEC: 283 MOSM/KG (ref 275–300)
PHOSPHATE SERPL-MCNC: 3.6 MG/DL (ref 2.5–4.9)
PLAT MORPH BLD: (no result)
PLATELET # BLD EST: NORMAL 10*3/UL
PLATELET # BLD: 295 10X3/UL (ref 130–400)
PMV BLD AUTO: 13.1 FL (ref 7.4–10.4)
POTASSIUM SERPL-SCNC: 4.2 MMOL/L (ref 3.5–5.1)
RBC # BLD AUTO: 4.92 10X6/UL (ref 4.2–6.1)
SODIUM SERPL-SCNC: 142 MMOL/L (ref 136–145)
WBC # BLD AUTO: 21 10X3/UL (ref 4.8–10.8)

## 2019-08-08 NOTE — NUR
BED SIDE REPORT RECEIVED. PT LAYING IN BED WITH EYES OPEN WATCHING TV. 3L O2
NC. PT HAS BED LOW AND CALL LIGHT AND URINAL IN REACH. PT HAS NO S/S OF
DISTRESS. DENIES ANY NEEDS AT THIS TIME. NAME AND DATE PLACED ON BOARD. WILL
CPOC

## 2019-08-08 NOTE — MORECARE
CASE MANAGEMENT DISCHARGE SUMMARY
 
 
PATIENT: NEVILLE ESTEBAN                        UNIT: [id number]
ACCOUNT#: [id number]                       ADM DATE: 19
AGE: 79     : 40  SEX: M            ROOM/BED: D.2132    
AUTHOR: AMADOU,DOC                             PHYSICIAN:                               
 
REFERRING PHYSICIAN: GARRISON BECERRA MD               
DATE OF SERVICE: 19
Discharge Plan
 
 
Patient Name: NEVILLE ESTEBAN
[facility] [place]
Encounter #: [id number]
Medical Record #: [id number]
: 1940
Planned Disposition: Skilled Nursing Facility
Anticipated Discharge Date: 19
 
Discharge Date: 
Expected LOS: 7
Initial Reviewer: [id number]
Initial Review Date: 2019
Generated: 19  10:16 am 
Comments
 
DCP- Discharge Planning
 
Updated by [id number]: Migel Vallecillo on 19   8:10 am CT
Patient Name:  NEVILLE ESTEBAN   
Encounter No:  [id number]   
:  1940   
Primary Insurance:  MEDICARE PART A ONLY  
Anticipated DC Date: 2019   
Planned Disposition:  Skilled Nursing Facility  
External Planned Provider: [facility] NURSING AND REHAB, MEDICARE REHAB BED  
  
  
DCP follow-up note: CM SPOKE TO [facility], PT HAS BEEN ACCEPTED CLINICALLY FOR 
REHAB AT THE [facility] WITH TRANSITION TO LONG TERM CARE.  PT WILL HAVE TO 
PROVIDE FINANCIAL INFORMATION FOR MEDICAID APPLICATION AS PT IS NOT ELIGIBLE 
FOR [facility] NURSING HOME BENEFITS.  CM FAXED UPDATE TO [facility] FOR [facility] AT 
[phone]. CM SPOKE TO PT IN ROOM, INFORMED PT THAT WITHOUT PARTICIPATION 
IN THERAPY SERVICES, PLACEMENT MAY NOT BE POSSIBLE.  PT PROMISED TO 
PARTICIPATE IN HOSPITAL THERAPY SERVICES.  
  
CM WAITING PT'S PARTICIPATION WITH PHYSICAL THERAPY EVALUATION AND THERAPY 
SERVICES AS WELL AS FINANCIAL ARRANGEMENTS FOR PLACEMENT AT THE [facility].  
 
  
Migel Vallecillo., CASE MANAGEMENT
DCP- Discharge Planning
 
Updated by [email or web address]: Migel Vallecillo on 19   2:05 pm CT
Patient Name: NEVILLE ESTEBAN                                     
Admission Status: ER   
Accout number: [id number]                              
Admission Date: 2019   
: 1940                                                        
Admission Diagnosis:UNSPECIFIED ABDOMINAL PAIN   
Attending: GARRISON BECERRA                                                
Current LOS:  5   
  
Anticipated DC Date:    
Planned Disposition: Skilled Nursing Facility   
Primary Insurance: MEDICARE PART A ONLY   
PLANNED EXTERNAL PROVIDER :  [facility] NURSING AND REHAB, MEDICARE REHAB BED 
  
Discharge Planning Comments:   
CM RECEIVED REQUEST FROM PT THROUGH BEDSIDE NURSE TO SPEAK TO PT REGARDING 
DISCHARGE PLANNING.  CM MET WITH PT IN ROOM TO DISCUSS DISCHARGE PLANNING AND 
NEEDS. PT REPORTS LIVING AT HOME INDEPENDENTLY AND ALONE.  [facility]  PT HAS HAD NO OUTSIDE SERVICES ASSISTING IN THE HOME. CM 
DISCUSSED AVAILABILITY OF HOME HEALTH, REHAB SERVICES AND MEDICAL EQUIPMENT. 
PT REPORTS HE IS NOT ABLE TO TAKE CARE OF HIMSELF ANY LONGER.  PT WANTS CM TO 
PLACE HIM SOMEWHERE FOR LONG TERM CARE.  PT REPORTS HE IS NOT ABLE TO GET OUT 
OF BED WITHOUT ASSISTANCE.  PT RECEIVES $1030 IN SOCIAL SECURITY AND $130 IN 
[employer] DISABILITY MONTHLY.  PT WOULD ONLY GO TO A VA HOME AS A LAST RESORT 
AND IS ONLY 10% SERVICE CONNECTED WITH DISABILITY.  PT DENIES HAVING ANY 
OTHER ASSETS.  PT LIN WITH [employer] IN [place].  PT REPORTS BEING PAID UP 
FOR THE REST OF THE MONTH'S RENT AT THE [place] HOUSING AUTHORITY 
[facility], APARTMENT [address].  CM DISCUSSED SKILLED NURSING FACILITY FOR REHAB, PT 
REPORTS WILLINGNESS FOR REHAB BUT STATES HE NEEDS LONG TERM CARE TOO.  
LISTING OF NURSING HOMES PROVIDED, PT SIGNED CONSENT FOR NO SKILLED NURSING 
FACILITY PREFERENCE.  IMPORTANT MESSAGE FROM MEDICARE PROVIDED AND EXPLAINED. 
  
CM NOTIFIED [facility] OF [facility] OF REHAB AND LONG TERM CARE REFERRAL, 
[phone].  CM FAXED REFERRAL TO [facility] OF [facility] [phone].  
  
CM WAITING ADMISSION DETERMINATION FROM THE [facility] NURSING AND REHAB FOR REHAB 
AND LONG TERM CARE PLACEMENT.  
  
: Migel Vallecillo
 DCPIA - Discharge Planning Initial Assessment
 
Updated by [email or web address]: Migel Vallecillo on 19   2:58 pm
*  Is the patient Alert and Oriented?
Yes
*  How many steps to enter\exit or inside your home? ELEVATOR *  PCP DR. MEJIA - [facility] CL[facility]
*  Pharmacy
 
[facility] MAIL ORDER
 
*  Preadmission Environment
Home Alone
*  ADLs
Independent
*  Equipment
Walker
*  Other Equipment
[employer] PROVIDER
*  List name and contact numbers for known caregivers / representatives who 
currently or will assist patient after discharge:
RANULFO BLANCA, FRIEND, [phone]
*  Additional services required to return to the preadmission environment?
No
*  Can the patient safely return to the preadmission environment?
No
*  Has this patient been hospitalized within the prior 30 days at any 
hospital?
No
 
 
 
 
 
Coverage Notice
 
Reviewer: [email or web address] Amanda Vallecillo
 
Notice Issued Date-Time: 2019  13:30
Notice Type: IM Discharge Notice
 
Notice Delivered To: Patient
Relationship to Patient: 
Representative Name: 
 
Delivery Method: HAND - Hand Delivered
Emili Days:
Prior Verbal Notification: 
 
Recipient Understood Notice: Yes
Recipient Signature: Yes
Med Rec Note Co-signed by Attending:
 
Coverage Notice Comment:  
Reviewer: [email or web address] Vallecillo
 
Notice Issued Date-Time: 2019  13:30
Notice Type: Patient Choice Letter
 
Notice Delivered To: Patient
Relationship to Patient: 
 
Representative Name: 
 
Delivery Method: HAND - Hand Delivered
Emili Days:
Prior Verbal Notification: 
 
Recipient Understood Notice: Yes
Recipient Signature: Yes
Med Rec Note Co-signed by Attending:
 
Coverage Notice Comment:  NO SKILLED NURSING FACILITY PREFERENCE
 
Last DP export: 19   8:10 am
Patient Name: NEVILLE ESTEBAN
Encounter #: [id number]
Page [phone]
 
 
 
 
 
Electronically Signed by ANA TOBAR on 19 at 0916
 
 
 
 
 
 
**All edits/amendments must be made on the electronic document**
 
DICTATION DATE: 19     : KERON  19     
RPT#: [id number]                                DC DATE:        
                                               STATUS: ADM IN  
[facility]
[address] [place] [address]
***END OF REPORT***

## 2019-08-08 NOTE — MORECARE
CASE MANAGEMENT DISCHARGE SUMMARY
 
 
PATIENT: NEVILLE ESTEBAN                        UNIT: [id number]
ACCOUNT#: [id number]                       ADM DATE: 19
AGE: 79     : 40  SEX: M            ROOM/BED: D.2132    
AUTHOR: AMADOU,DOC                             PHYSICIAN:                               
 
REFERRING PHYSICIAN: GARRISON BECERRA MD               
DATE OF SERVICE: 19
Discharge Plan
 
 
Patient Name: NEVILLE ESTEBAN
Facility: [facility]
Encounter #: [id number]
Medical Record #: [id number]
: 1940
Planned Disposition: Skilled Nursing Facility
Anticipated Discharge Date: 19
 
Discharge Date: 
Expected LOS: 7
Initial Reviewer: [id number]
Initial Review Date: 2019
Generated: 19  10:09 am 
Comments
 
DCP- Discharge Planning
 
Updated by [id number]: Migel Vallecillo on 19   2:05 pm CT
Patient Name: NEVILLE ESTEBAN                                     
Admission Status: ER   
Accout number: [id number]                              
Admission Date: 2019   
: 1940                                                        
Admission Diagnosis:UNSPECIFIED ABDOMINAL PAIN   
Attending: GARRISON BECERRA                                                
Current LOS:  5   
  
Anticipated DC Date:    
Planned Disposition: Skilled Nursing Facility   
Primary Insurance: MEDICARE PART A ONLY   
PLANNED EXTERNAL PROVIDER :  THE [facility], MEDICARE REHAB BED 
  
Discharge Planning Comments:   
CM RECEIVED REQUEST FROM PT THROUGH BEDSIDE NURSE TO SPEAK TO PT REGARDING 
DISCHARGE PLANNING.  CM MET WITH PT IN ROOM TO DISCUSS DISCHARGE PLANNING AND 
NEEDS. PT REPORTS LIVING AT HOME INDEPENDENTLY AND ALONE.  [facility]  PT HAS HAD NO OUTSIDE SERVICES ASSISTING IN THE HOME. CM 
 
DISCUSSED AVAILABILITY OF HOME HEALTH, REHAB SERVICES AND MEDICAL EQUIPMENT. 
PT REPORTS HE IS NOT ABLE TO TAKE CARE OF HIMSELF ANY LONGER.  PT WANTS CM TO 
PLACE HIM SOMEWHERE FOR LONG TERM CARE.  PT REPORTS HE IS NOT ABLE TO GET OUT 
OF BED WITHOUT ASSISTANCE.  PT RECEIVES $1030 IN SOCIAL SECURITY AND $130 IN 
VETER[employer] DISABILITY MONTHLY.  PT WOULD ONLY GO TO A VA HOME AS A LAST RESORT 
AND IS ONLY 10% SERVICE CONNECTED WITH DISABILITY.  PT DENIES HAVING ANY 
OTHER ASSETS.  PT LIN WITH [employer] IN [place].  PT REPORTS BEING PAID UP 
FOR THE [facility], [address]ARTMENT [address].  CM DISCUSSED SKILLED NURSING FACILITY FOR REHAB, PT 
REPORTS WILLINGNESS FOR REHAB BUT STATES HE NEEDS LONG TERM CARE TOO.  
LISTING OF NURSING HOMES PROVIDED, PT SIGNED CONSENT FOR NO SKILLED NURSING 
FACILITY PREFERENCE.  IMPORTANT MESSAGE FROM MEDICARE PROVIDED AND EXPLAINED. 
  
CM NOTIFIED LASHAWN OF [facility] OF REHAB AND LONG TERM CARE REFERRAL, 
[phone].  CM FAXED REFERRAL TO LASHAWN OF [facility] [phone].  
  
CM WAITING ADMISSION DETERMINATION FROM THE [facility] FOR REHAB 
AND LONG TERM CARE PLACEMENT.  
  
: Migel Vallecillo
 DCPIA - Discharge Planning Initial Assessment
 
Updated by [id number]: Migel Vallecillo on 19   2:58 pm
*  Is the patient Alert and Oriented?
Yes
*  How many steps to enter\exit or inside your home? ELEVATOR *  PCP DR. MEJIA - [facility] CLINIC
*  Pharmacy
[facility] MAIL ORDER
 
*  Preadmission Environment
Home Alone
*  ADLs
Independent
*  Equipment
Walker
*  Other Equipment
[employer], MEDICAL EQUIPMENT PROVIDER
*  List name and contact numbers for known caregivers / representatives who 
currently or will assist patient after discharge:
RANULFO EVANGELIST, FRIEND, [phone]
*  Additional services required to return to the preadmission environment?
No
*  Can the patient safely return to the preadmission environment?
No
*  Has this patient been hospitalized within the prior 30 days at any 
hospital?
No
 
 
 
 
 
 
Coverage Notice
 
Reviewer: [id number] Amanda Vallecillo
 
Notice Issued Date-Time: 2019  13:30
Notice Type: IM Discharge Notice
 
Notice Delivered To: Patient
Relationship to Patient: 
Representative Name: 
 
Delivery Method: HAND - Hand Delivered
Emili Days:
Prior Verbal Notification: 
 
Recipient Understood Notice: Yes
Recipient Signature: Yes
Med Rec Note Co-signed by Attending:
 
Coverage Notice Comment:  
Reviewer: [id number] Amanda Vallecillo
 
Notice Issued Date-Time: 2019  13:30
Notice Type: Patient Choice Letter
 
Notice Delivered To: Patient
Relationship to Patient: 
Representative Name: 
 
Delivery Method: HAND - Hand Delivered
Emili Days:
Prior Verbal Notification: 
 
Recipient Understood Notice: Yes
Recipient Signature: Yes
Med Rec Note Co-signed by Attending:
 
Coverage Notice Comment:  NO SKILLED NURSING FACILITY PREFERENCE
 
Last DP export: 19   2:10 pm
Patient Name: NEVILLE ESTEBAN
 
Encounter #: [id number]
Page [id number]
 
 
 
 
 
Electronically Signed by ANA TOBAR on 19 at 0910
 
 
 
 
 
 
**All edits/amendments must be made on the electronic document**
 
DICTATION DATE: 19     : KERON  19     
RPT#: [id number]                                DC DATE:        
                                               STATUS: ADM IN  
[facility]
 [place] [address]
***END OF REPORT***

## 2019-08-08 NOTE — NUR
PT REFUSING ALL MEDICATIONS EXCEPT FLOMAX AND SOMETHING FOR HIS STOMACH
BECAUSE HE STATES DINNER MESSED WITH HIS ULCER. EDUCATED PT ON RISK OF
REFUSING MEDICATIONS. RISK OF CLOTS WHEN REFUSING LOVENOX ETC. PT VERBALIZED
UNDERSTANDING. STATES HE DOESNT WANT TO STRESS ON THE END RESULT HE JUST WANTS
TO BE COMFORTABLE NOW. PT HAS NO S/S OF DISTRESS. WILL CPOC

## 2019-08-08 NOTE — NUR
OT NOTE: PT COMPLETED BED MOB TASKS WITH MIN A. PT COMPLETED GROOMING TASKS
WITH MOD A. PT TOLERATED THERAPY WELL AND PARTICIPATED AS ABLE.
THANK YOU, MCKENZIE KIDD

## 2019-08-08 NOTE — NUR
Nutrition Follow-up:
Diet: Regular
PO intake: 75% of breakfast this AM with 100% Ensure
Last BM: 8/7
No new wt
Labs reviewed
Meds reviewed
Rec continue current diet as tolerated. Honor food preferences. Rec new pt wt.
RD following.

## 2019-08-09 VITALS — SYSTOLIC BLOOD PRESSURE: 131 MMHG | DIASTOLIC BLOOD PRESSURE: 97 MMHG

## 2019-08-09 VITALS — DIASTOLIC BLOOD PRESSURE: 60 MMHG | SYSTOLIC BLOOD PRESSURE: 110 MMHG

## 2019-08-09 VITALS — DIASTOLIC BLOOD PRESSURE: 87 MMHG | SYSTOLIC BLOOD PRESSURE: 127 MMHG

## 2019-08-09 LAB
BASOPHILS NFR BLD AUTO: 0.1 % (ref 0–2)
EOSINOPHIL NFR BLD: 8.8 % (ref 0–7)
ERYTHROCYTE [DISTWIDTH] IN BLOOD BY AUTOMATED COUNT: 16.2 % (ref 11.5–14.5)
HCT VFR BLD CALC: 45.3 % (ref 42–54)
HGB BLD-MCNC: 13.7 G/DL (ref 13.5–17.5)
IMM GRANULOCYTES NFR BLD: 1.3 % (ref 0–5)
LYMPHOCYTES NFR BLD AUTO: 3.2 % (ref 15–50)
MAGNESIUM SERPL-MCNC: 2.1 MG/DL (ref 1.8–2.4)
MCH RBC QN AUTO: 27 PG (ref 26–34)
MCHC RBC AUTO-ENTMCNC: 30.2 G/DL (ref 31–37)
MCV RBC: 89.3 FL (ref 80–100)
MONOCYTES NFR BLD: 15.3 % (ref 2–11)
NEUTROPHILS NFR BLD AUTO: 71.3 % (ref 40–80)
PHOSPHATE SERPL-MCNC: 3.5 MG/DL (ref 2.5–4.9)
PLATELET # BLD: 299 10X3/UL (ref 130–400)
PMV BLD AUTO: 13.4 FL (ref 7.4–10.4)
RBC # BLD AUTO: 5.07 10X6/UL (ref 4.2–6.1)
WBC # BLD AUTO: 20.4 10X3/UL (ref 4.8–10.8)

## 2019-08-09 NOTE — MORECARE
CASE MANAGEMENT DISCHARGE SUMMARY
 
 
PATIENT: NEVILLE ESTEBAN                        UNIT: [id number]
ACCOUNT#: [id number]                       ADM DATE: 19
AGE: 79     : 40  SEX: M            ROOM/BED: D.2132    
AUTHOR: AMADOU,DOC                             PHYSICIAN:                               
 
REFERRING PHYSICIAN: GARRISON BECERRA MD               
DATE OF SERVICE: 19
Discharge Plan
 
 
Patient Name: NEVILLE ESTEBAN
Facility: [facility]:[place]
Encounter #: [id number]
Medical Record #: [id number]
: 1940
Planned Disposition: Skilled Nursing Facility
Anticipated Discharge Date: 19
 
Discharge Date: 
Expected LOS: 8
Initial Reviewer: [id number]
Initial Review Date: 2019
Generated: 19  10:29 am 
Comments
 
DCP- Discharge Planning
 
Updated by [id number]: Migel Vallecillo on 19   8:28 am CT
Patient Name:  NEVILLE ESTEBAN   
Encounter No:  [id number]   
:  1940   
Primary Insurance:  MEDICARE PART A ONLY  
Anticipated DC Date: 2019   
Planned Disposition:  Skilled Nursing Facility  
External Planned Provider: [facility] MED[facility]ARE REHAB BED  
  
  
DCP follow-up note: ON 19 CM SPOKE TO LASHAWN OF THE [facility], [phone], 
PT HAS BEEN ACCEPTED.  NELE FLOWERS NOTIFIED, CM ADVISED PT HAD FEVER AND 
ELEVATED WBC'S, NOT READY TO DC 19.  CM NOTIFIED [place] OF [facility].  
  
FOR DISCHARGE, FAX DISCHARGE INFORMATION TO THE [facility], [phone], 
NURSE REPORT TO BE CALLED TO THE [facility] [phone]. [facility] TO 
ARRANGE VAN TRANSPORTATION.  
  
Migel Vallecillo, CASE MANAGEMENT
 
DCP- Discharge Planning
 
Updated by [email or web address]: Migel Vallecillo on 19   8:10 am CT
Patient Name:  NEVILLE ESTEBAN   
Encounter No:  [id number]   
:  1940   
Primary Insurance:  MEDICARE PART A ONLY  
Anticipated DC Date: 2019   
Planned Disposition:  Skilled Nursing Facility  
External Planned Provider: [facility]  
  
  
DCP follow-up note: CM SPOKE TO [place], PT HAS BEEN ACCEPTED CLINICALLY FOR 
REHAB AT THE [facility] WITH TRANSITION TO LONG TERM CARE.  PT WILL HAVE TO 
PROVIDE FINANCIAL INFORMATION FOR MEDICAID APPLICATION AS PT IS NOT ELIGIBLE 
FOR [facility] NURSING HOME BENEFITS.  CM FAXED UPDATE TO LASHAWN FOR [facility] AT 
[phone]. CM SPOKE TO PT IN ROOM, INFORMED PT THAT WITHOUT PARTICIPATION 
IN THERAPY SERVICES, PLACEMENT MAY NOT BE POSSIBLE.  PT PROMISED TO 
PARTICIPATE IN HOSPITAL THERAPY SERVICES.  
  
CM WAITING PT'S PARTICIPATION WITH PHYSICAL THERAPY EVALUATION AND THERAPY 
SERVICES AS WELL AS FINANCIAL ARRANGEMENTS FOR PLACEMENT AT THE [facility].  
  
Migel Vallecillo., CASE MANAGEMENT
DCP- Discharge Planning
 
Updated by [email or web address]: Migel Vallecillo on 19   2:05 pm CT
Patient Name: NEVILLE ESTEBAN                                     
Admission Status: ER   
Accout number: [id number]                              
Admission Date: 2019   
: 1940                                                        
Admission Diagnosis:UNSPECIFIED ABDOMINAL PAIN   
Attending: GARRISON BECERRA                                                
Current LOS:  5   
  
Anticipated DC Date:    
Planned Disposition: Skilled Nursing Facility   
Primary Insurance: MEDICARE PART A ONLY   
PLANNED EXTERNAL PROVIDER :  THE [facility] NURSING AND RE[facility], MEDICARE REHAB BED 
  
Discharge Planning Comments:   
CM RECEIVED REQUEST FROM PT THROUGH BEDSIDE NURSE TO SPEAK TO PT REGARDING 
DISCHARGE PLANNING.  CM MET WITH PT IN ROOM TO DISCUSS DISCHARGE PLANNING AND 
NEEDS. PT REPORTS LIVING AT HOME INDEPENDENTLY AND ALONE.  PT [facility].  PT HAS HAD NO OUTSIDE SERVICES ASSISTING IN THE HOME. CM 
DISCUSSED AVAILABILITY OF HOME HEALTH, REHAB SERVICES AND MEDICAL EQUIPMENT. 
PT REPORTS HE IS NOT ABLE TO TAKE CARE OF HIMSELF ANY LONGER.  PT WANTS CM TO 
PLACE HIM SOMEWHERE FOR LONG TERM CARE.  PT REPORTS HE IS NOT ABLE TO GET OUT 
OF BED WITHOUT ASSISTANCE.  PT RECEIVES $1030 IN SOCIAL SECURITY AND $130 IN 
VETER[employer] DISABILITY MONTHLY.  PT WOULD ONLY GO TO A VA HOME AS A LAST RESORT 
AND IS ONLY 10% SERVICE CONNECTED WITH DISABILITY.  PT DENIES HAVING ANY 
 
OTHER ASSETS.  PT LIN WITH [employer] IN [place].  PT REPORTS BEING PAID UP 
FOR THE REST OF THE MONTH'S RENT AT [facility] [place] [facility]  CM DISCUSSED SKILLED NURSING FACILITY FOR REHAB, PT 
REPORTS WILLINGNESS FOR REHAB BUT STATES HE NEEDS LONG TERM CARE TOO.  
LISTING OF NURSING HOMES PROVIDED, PT SIGNED CONSENT FOR NO SKILLED NURSING 
FACILITY PREFERENCE.  IMPORTANT MESSAGE FROM MEDICARE PROVIDED AND EXPLAINED. 
  
CM NOTIFIED LASHAWN OF [facility] OF REHAB AND LONG TERM CARE REFERRAL, 
[phone].  CM FAXED REFERRAL TO LASHAWN OF [facility] -[phone].  
  
CM WAITING ADMISSION DETERMINATION FROM THE [facility] NURSING AND REHAB FOR REHAB 
AND LONG TERM CARE PLACEMENT.  
  
: Migel Vallecillo
 DCPIA - Discharge Planning Initial Assessment
 
Updated by [email or web address]: Migel Vallecillo on 19   2:58 pm
*  Is the patient Alert and Oriented?
Yes
*  How many steps to enter\exit or inside your home? ELEVATOR *  PCP DR. MEJIA - [facility] CLINIC
*  Pharmacy
[facility] MAIL ORDER
 
*  Preadmission Environment
Home Alone
*  ADLs
Independent
*  Equipment
Walker
*  Other Equipment
[employer], MEDICAL EQUIPMENT PROVIDER
*  List name and contact numbers for known caregivers / representatives who 
currently or will assist patient after discharge:
RANULFO BLANCA, FRIEND, [phone]
*  Additional services required to return to the preadmission environment?
No
*  Can the patient safely return to the preadmission environment?
No
*  Has this patient been hospitalized within the prior 30 days at any 
hospital?
No
 
 
 
 
 
Coverage Notice
 
Reviewer: MAVERICK Vallecillo
 
Notice Issued Date-Time: 2019  13:30
 
Notice Type: IM Discharge Notice
 
Notice Delivered To: Patient
Relationship to Patient: 
Representative Name: 
 
Delivery Method: HAND - Hand Delivered
Emili Days:
Prior Verbal Notification: 
 
Recipient Understood Notice: Yes
Recipient Signature: Yes
Med Rec Note Co-signed by Attending:
 
Coverage Notice Comment:  
Reviewer: MAVERICK Vallecillo
 
Notice Issued Date-Time: 2019  13:30
Notice Type: Patient Choice Letter
 
Notice Delivered To: Patient
Relationship to Patient: 
Representative Name: 
 
Delivery Method: HAND - Hand Delivered
Emili Days:
Prior Verbal Notification: 
 
Recipient Understood Notice: Yes
Recipient Signature: Yes
Med Rec Note Co-signed by Attending:
 
Coverage Notice Comment:  NO SKILLED NURSING FACILITY PREFERENCE
 
Last DP export: 19   8:16 am
Patient Name: NEVILLE ESTEBAN
 
Encounter #: [id number]
Page [id number]
 
 
 
 
 
Electronically Signed by ANA TOBAR on 19 at 0929
 
 
 
 
 
 
**All edits/amendments must be made on the electronic document**
 
DICTATION DATE: 19     : KERON  19     
RPT#: [id number]                                DC DATE:        
                                               STATUS: ADM IN  
[facility]
 [place] [address]
***END OF REPORT***

## 2019-08-09 NOTE — NUR
PT COMPLAINING ABOUT OXYGEN TUBING NOT BEING VERY CLEAN, ASKING FOR A NEW ONE
NURSE GAVE PT A NEW TUBING AND A MOUTH MOISTURIZER. MORNING MEDICATIONS GIVEN.
WILL CPOC

## 2019-08-09 NOTE — MORECARE
CASE MANAGEMENT DISCHARGE SUMMARY
 
 
PATIENT: NEVILLE ESTEBAN                        UNIT: [id number]
ACCOUNT#: [id number]                       ADM DATE: 19
AGE: 79     : 40  SEX: M            ROOM/BED: D.2132    
AUTHOR: AMADOU,DOC                             PHYSICIAN:                               
 
REFERRING PHYSICIAN: GARRISON BECERRA MD               
DATE OF SERVICE: 19
Discharge Plan
 
 
Patient Name: NEVILLE ESTEBAN
Facility: [facility]:[place]
Encounter #: [id number]
Medical Record #: [id number]
: 1940
Planned Disposition: Skilled Nursing Facility
Anticipated Discharge Date: 19
 
Discharge Date: 
Expected LOS: 8
Initial Reviewer: [email or web address]
Initial Review Date: 2019
Generated: 19  10:43 am 
Comments
 
DCP- Discharge Planning
 
Updated by [email or web address]: Migel Vallecillo on 19   8:28 am CT
Patient Name:  NEVILLE ESTEBAN   
Encounter No:  [id number]   
:  1940   
Primary Insurance:  MEDICARE PART A ONLY  
Anticipated DC Date: 2019   
Planned Disposition:  Skilled Nursing Facility  
External Planned Provider: [facility] MEDICARE REHAB BED  
  
  
DCP follow-up note: ON 19 CM SPOKE TO LASHAWN OF THE [facility], [phone], 
PT HAS BEEN ACCEPTED.  NEEL FLOWERS NOTIFIED, CM ADVISED PT HAD FEVER AND 
ELEVATED WBC'S, NOT READY TO DC 19.  CM NOTIFIED [place] OF [facility].  
  
FOR DISCHARGE, FAX DISCHARGE INFORMATION TO THE [facility], [phone], 
NURSE REPORT TO BE CALLED TO THE [facility] -[phone]. [facility] TO 
ARRANGE VAN TRANSPORTATION.  
  
Migel Vallecillo, CASE MANAGEMENT
 
DCP- Discharge Planning
 
Updated by [id number]: Migel Vallecillo on 19   8:10 am CT
Patient Name:  NEVILLE ESTEBAN   
Encounter No:  [id number]   
:  1940   
Primary Insurance:  MEDICARE PART A ONLY  
Anticipated DC Date: 2019   
Planned Disposition:  Skilled Nursing Facility  
External Planned Provider: [facility]  
  
  
DCP follow-up note: CM SPOKE TO [place], PT HAS BEEN ACCEPTED CLINICALLY FOR 
REHAB AT THE [facility] WITH TRANSITION TO LONG TERM CARE.  PT WILL HAVE TO 
PROVIDE FINANCIAL INFORMATION FOR MEDICAID APPLICATION AS PT IS NOT ELIGIBLE 
FOR VA NURSING HOME BENEFITS.  CM FAXED UPDATE TO LASHAWN FOR [facility] AT 
[phone]. CM SPOKE TO PT IN ROOM, INFORMED PT THAT WITHOUT PARTICIPATION 
IN THERAPY SERVICES, PLACEMENT MAY NOT BE POSSIBLE.  PT PROMISED TO 
PARTICIPATE IN HOSPITAL THERAPY SERVICES.  
  
CM WAITING PT'S PARTICIPATION WITH PHYSICAL THERAPY EVALUATION AND THERAPY 
SERVICES AS WELL AS FINANCIAL ARRANGEMENTS FOR PLACEMENT AT THE [facility].  
  
Migel Vallecillo., CASE MANAGEMENT
DCP- Discharge Planning
 
Updated by [id number]: Migel Vallecillo on 19   2:05 pm CT
Patient Name: NEVILLE ESTEBAN                                     
Admission Status: ER   
Accout number: [id number]                              
Admission Date: 2019   
: 1940                                                        
Admission Diagnosis:UNSPECIFIED ABDOMINAL PAIN   
Attending: GARRISON BECERRA                                                
Current LOS:  5   
  
Anticipated DC Date:    
Planned Disposition: Skilled Nursing Facility   
Primary Insurance: MEDICARE PART A ONLY   
PLANNED EXTERNAL PROVIDER :  THE [facility] NURSING AND REH[facility], MEDICARE REHAB BED 
  
Discharge Planning Comments:   
CM RECEIVED REQUEST FROM PT THROUGH BEDSIDE NURSE TO SPEAK TO PT REGARDING 
DISCHARGE PLANNING.  CM MET WITH PT IN ROOM TO DISCUSS DISCHARGE PLANNING AND 
NEEDS. PT REPORTS LIVING AT HOME INDEPENDENTLY AND ALONE.  PT [facility]  PT HAS HAD NO OUTSIDE SERVICES ASSISTING IN THE HOME. CM 
DISCUSSED AVAILABILITY OF HOME HEALTH, REHAB SERVICES AND MEDICAL EQUIPMENT. 
PT REPORTS HE IS NOT ABLE TO TAKE CARE OF HIMSELF ANY LONGER.  PT WANTS CM TO 
PLACE HIM SOMEWHERE FOR LONG TERM CARE.  PT REPORTS HE IS NOT ABLE TO GET OUT 
OF BED WITHOUT ASSISTANCE.  PT RECEIVES $1030 IN SOCIAL SECURITY AND $130 IN 
VET[employer] DISABILITY MONTHLY.  PT WOULD ONLY GO TO A VA HOME AS A LAST RESORT 
AND IS ONLY 10% SERVICE CONNECTED WITH DISABILITY.  PT DENIES HAVING ANY 
 
OTHER ASSETS.  PT LIN WITH [employer] IN [place].  PT REPORTS BEING PAID UP 
FOR THE REST OF THE MONTH'S RENT AT THE [place] [facility].  CM DISCUSSED SKILLED NURSING FACILITY FOR REHAB, PT 
REPORTS WILLINGNESS FOR REHAB BUT STATES HE NEEDS LONG TERM CARE TOO.  
LISTING OF NURSING HOMES PROVIDED, PT SIGNED CONSENT FOR NO SKILLED NURSING 
FACILITY PREFERENCE.  IMPORTANT MESSAGE FROM MEDICARE PROVIDED AND EXPLAINED. 
  
CM NOTIFIED LASHAWN OF [facility] OF REHAB AND LONG TERM CARE REFERRAL, 
[phone].  CM FAXED REFERRAL TO LASHAWN OF [facility] [phone].  
  
CM WAITING ADMISSION DETERMINATION FROM THE [facility] NURSING AND REHAB FOR REHAB 
AND LONG TERM CARE PLACEMENT.  
  
: Migel Vallecillo
 DCPIA - Discharge Planning Initial Assessment
 
Updated by [email or web address]: Migel Vallecillo on 19   2:58 pm
*  Is the patient Alert and Oriented?
Yes
*  How many steps to enter\exit or inside your home? ELEVATOR *  PCP DR. MEJIA - [facility] CLINIC
*  Pharmacy
[facility] MAIL ORDER
 
*  Preadmission Environment
Home Alone
*  ADLs
Independent
*  Equipment
Walker
*  Other Equipment
[employer] MEDICAL EQUIPMENT PROVIDER
*  List name and contact numbers for known caregivers / representatives who 
currently or will assist patient after discharge:
RANULFO BLANCA, FRIEND, [phone]
*  Additional services required to return to the preadmission environment?
No
*  Can the patient safely return to the preadmission environment?
No
*  Has this patient been hospitalized within the prior 30 days at any 
hospital?
No
 
 
 
 
 
Coverage Notice
 
Reviewer: MAVERICK Vallecillo
 
Notice Issued Date-Time: 2019  13:30
 
Notice Type: IM Discharge Notice
 
Notice Delivered To: Patient
Relationship to Patient: 
Representative Name: 
 
Delivery Method: HAND - Hand Delivered
Emili Days:
Prior Verbal Notification: 
 
Recipient Understood Notice: Yes
Recipient Signature: Yes
Med Rec Note Co-signed by Attending:
 
Coverage Notice Comment:  
Reviewer: MAVERICK Vallecillo
 
Notice Issued Date-Time: 2019  13:30
Notice Type: Patient Choice Letter
 
Notice Delivered To: Patient
Relationship to Patient: 
Representative Name: 
 
Delivery Method: HAND - Hand Delivered
Emili Days:
Prior Verbal Notification: 
 
Recipient Understood Notice: Yes
Recipient Signature: Yes
Med Rec Note Co-signed by Attending:
 
Coverage Notice Comment:  NO SKILLED NURSING FACILITY PREFERENCE
Reviewer: [id number] Amanda Vallecillo
 
Notice Issued Date-Time: 2019   9:35
Notice Type: IM Discharge Notice
 
Notice Delivered To: Patient
Relationship to Patient: 
Representative Name: 
 
Delivery Method: HAND - Hand Delivered
Emili Days:
Prior Verbal Notification: 
 
Recipient Understood Notice: Yes
Recipient Signature: Yes
Med Rec Note Co-signed by Attending:
 
Coverage Notice Comment:  
 
 
Last DP export: 19   8:29 am
Patient Name: CARLITO, NEVILLE
Encounter #: [id number]
Page [phone]
 
 
 
 
 
Electronically Signed by ANA TOBAR on 19 at 0943
 
 
 
 
 
 
**All edits/amendments must be made on the electronic document**
 
DICTATION DATE: 19     : KERON  19     
RPT#: [id number]                                DC DATE:        
                                               STATUS: ADM IN  
[facility]
 [place] [address]
***END OF REPORT***

## 2019-08-09 NOTE — MORECARE
CASE MANAGEMENT DISCHARGE SUMMARY
 
 
PATIENT: NEVILLE ESTEBAN                        UNIT: [id number]
ACCOUNT#: [id number]                       ADM DATE: 19
AGE: 79     : 40  SEX: M            ROOM/BED: D.2132    
AUTHOR: AMADOU,DOC                             PHYSICIAN:                               
 
REFERRING PHYSICIAN: GARRISON BECERRA MD               
DATE OF SERVICE: 19
Discharge Plan
 
 
Patient Name: NEVILLE ESTEBAN
Facility: [facility]:[place]
Encounter #: [id number]
Medical Record #: [id number]
: 1940
Planned Disposition: Skilled Nursing Facility
Anticipated Discharge Date: 19
 
Discharge Date: 
Expected LOS: 8
Initial Reviewer: MAVERICK
Initial Review Date: 2019
Generated: 19  10:51 am 
Comments
 
DCP- Discharge Planning
 
Updated by [id number]: Migel Vallecillo on 19   8:44 am CT
Patient Name:  NEVILLE ESTEBAN   
Encounter No:  [id number]   
:  1940   
Primary Insurance:  MEDICARE PART A ONLY  
Anticipated DC Date: 2019   
Planned Disposition:  Skilled Nursing Facility  
External Planned Provider: THE [facility]SING [facility] RE[facility]- [facility]TH, MEDICARE REHAB 
BED  
  
  
DCP follow-up note: ON 19 CM SPOKE TO [place] OF THE [facility], [phone], 
PT HAS BEEN ACCEPTED.  NEEL FLOWERS NOTIFIED, CM ADVISED PT HAD FEVER AND 
ELEVATED WBC'S, NOT READY TO DC 19.  CM NOTIFIED LASHAWN OF [facility].  
  
FOR DISCHARGE, FAX DISCHARGE INFORMATION TO THE [facility], [phone], 
NURSE REPORT TO BE CALLED TO THE [facility] [phone]. [facility] TO 
ARRANGE VAN TRANSPORTATION.  
  
Migel Vallecillo, CASE MANAGEMENT  
 
  
Appended by Migel Vallecillo on 2019 9:44 CDT:  
CM NOTIFIED PT OF THE [facility] ACCEPTANCE, PT IN AGREEMENT WITH DISCHARGE TO THE 
[facility]. IMPORTANT MESSAGE FROM MEDICARE PROVIDED AND EXPLAINED.  
  
FOR DISCHARGE, FAX DISCHARGE INFORMATION TO THE [facility], [phone], 
NURSE REPORT TO BE CALLED TO THE [facility] [phone]. [facility] TO 
ARRANGE VAN TRANSPORTATION.  
  
VANCE Yousif
DCP- Discharge Planning
 
Updated by [id number]: Migel Vallecillo on 19   8:10 am CT
Patient Name:  NEVILLE ESTEBAN   
Encounter No:  [id number]   
:  1940   
Primary Insurance:  MEDICARE PART A ONLY  
Anticipated DC Date: 2019   
Planned Disposition:  Skilled Nursing Facility  
External Planned Provider: THE [facility] NURSING AND REHAB, MEDICARE REHAB BED  
  
  
DCP follow-up note: CM SPOKE TO LASHAWN, PT HAS BEEN ACCEPTED CLINICALLY FOR 
REHAB AT THE [facility] WITH TRANSITION TO LONG TERM CARE.  PT WILL HAVE TO 
PROVIDE FINANCIAL INFORMATION FOR MEDICAID APPLICATION AS PT IS NOT ELIGIBLE 
FOR [facility] NURSING HOME BENEFITS.  CM FAXED UPDATE TO LASHAWN FOR THE [facility] AT 
[phone]. CM SPOKE TO PT IN ROOM, INFORMED PT THAT WITHOUT PARTICIPATION 
IN THERAPY SERVICES, PLACEMENT MAY NOT BE POSSIBLE.  PT PROMISED TO 
PARTICIPATE IN HOSPITAL THERAPY SERVICES.  
  
CM WAITING PT'S PARTICIPATION WITH PHYSICAL THERAPY EVALUATION AND THERAPY 
SERVICES AS WELL AS FINANCIAL ARRANGEMENTS FOR PLACEMENT AT THE [facility].  
  
Migel Vallecillo., CASE MANAGEMENT
DCP- Discharge Planning
 
Updated by [id number]: Migel Vallecillo on 19   2:05 pm CT
Patient Name: NEVILLE ESTEBAN                                     
Admission Status: ER   
Accout number: [id number]                              
Admission Date: 2019   
: 1940                                                        
Admission Diagnosis:UNSPECIFIED ABDOMINAL PAIN   
Attending: GARRISON BECERRA                                                
Current LOS:  5   
  
Anticipated DC Date:    
Planned Disposition: Skilled Nursing Facility   
Primary Insurance: MEDICARE PART A ONLY   
PLANNED EXTERNAL PROVIDER :  THE [facility] NURSING AND REHAB, MEDICARE REHAB BED 
  
Discharge Planning Comments:   
 
CM RECEIVED REQUEST FROM PT THROUGH BEDSIDE NURSE TO SPEAK TO PT REGARDING 
DISCHARGE PLANNING.  CM MET WITH PT IN ROOM TO DISCUSS DISCHARGE PLANNING AND 
NEEDS. PT REPORTS LIVING AT HOME INDEPENDENTLY AND ALONE.  [facility].  PT HAS HAD NO OUTSIDE SERVICES ASSISTING IN THE HOME. CM 
DISCUSSED AVAILABILITY OF HOME HEALTH, REHAB SERVICES AND MEDICAL EQUIPMENT. 
PT REPORTS HE IS NOT ABLE TO TAKE CARE OF HIMSELF ANY LONGER.  PT WANTS CM TO 
PLACE HIM SOMEWHERE FOR LONG TERM CARE.  PT REPORTS HE IS NOT ABLE TO GET OUT 
OF BED WITHOUT ASSISTANCE.  PT RECEIVES $1030 IN SOCIAL SECURITY AND $130 IN 
VETERANS DISABILITY MONTHLY.  PT WOULD ONLY GO TO A VA HOME AS A LAST RESORT 
AND IS ONLY 10% SERVICE CONNECTED WITH DISABILITY.  PT DENIES HAVING ANY 
OTHER ASSETS.  PT LIN WITH [facility] IN [place].  PT REPORTS BEING PAID UP 
FOR THE REST OF THE MONTH'S RENT AT THE [facility] 
[place], [address].  CM DISCUSSED SKILLED NURSING FACILITY FOR REHAB, PT 
REPORTS WILLINGNESS FOR REHAB BUT STATES HE NEEDS LONG TERM CARE TOO.  
LISTING OF NURSING HOMES PROVIDED, PT SIGNED CONSENT FOR NO SKILLED NURSING 
FACILITY PREFERENCE.  IMPORTANT MESSAGE FROM MEDICARE PROVIDED AND EXPLAINED. 
  
CM NOTIFIED LASHAWN OF [facility] OF REHAB AND LONG TERM CARE REFERRAL, 
[phone].  CM FAXED REFERRAL TO LASHAWN OF THE [facility] [phone].  
  
CM WAITING ADMISSION DETERMINATION FROM THE [facility] NURSING AND REHAB FOR REHAB 
AND LONG TERM CARE PLACEMENT.  
  
: Migel Vallecillo
 DCPIA - Discharge Planning Initial Assessment
 
Updated by MAVERICK: Migel Vallecillo on 19   2:58 pm
*  Is the patient Alert and Oriented?
Yes
*  How many steps to enter\exit or inside your home? ELEVATOR *  PCP DR. MEJIA - [facility]
*  Pharmacy
[facility] MAIL ORDER
 
*  Preadmission Environment
Home Alone
*  ADLs
Independent
*  Equipment
Walker
*  Other Equipment
[employer] MEDICAL EQUIPMENT PROVIDER
*  List name and contact numbers for known caregivers / representatives who 
currently or will assist patient after discharge:
RANULFO BLANCA, FRIEND, [phone]
*  Additional services required to return to the preadmission environment?
No
*  Can the patient safely return to the preadmission environment?
No
*  Has this patient been hospitalized within the prior 30 days at any 
hospital?
No
 
 
 
 
 
 
Coverage Notice
 
Reviewer: MAVERICK Vallecillo
 
Notice Issued Date-Time: 2019  13:30
Notice Type: IM Discharge Notice
 
Notice Delivered To: Patient
Relationship to Patient: 
Representative Name: 
 
Delivery Method: HAND - Hand Delivered
Emili Days:
Prior Verbal Notification: 
 
Recipient Understood Notice: Yes
Recipient Signature: Yes
Med Rec Note Co-signed by Attending:
 
Coverage Notice Comment:  
Reviewer: MAVERICK Vallecillo
 
Notice Issued Date-Time: 2019  13:30
Notice Type: Patient Choice Letter
 
Notice Delivered To: Patient
Relationship to Patient: 
Representative Name: 
 
Delivery Method: HAND - Hand Delivered
Emili Days:
Prior Verbal Notification: 
 
Recipient Understood Notice: Yes
Recipient Signature: Yes
Med Rec Note Co-signed by Attending:
 
Coverage Notice Comment:  NO SKILLED NURSING FACILITY PREFERENCE
Reviewer: MAVERICK Vallecillo
 
Notice Issued Date-Time: 2019   9:35
Notice Type: IM Discharge Notice
 
Notice Delivered To: Patient
Relationship to Patient: 
Representative Name: 
 
 
Delivery Method: HAND - Hand Delivered
Emili Days:
Prior Verbal Notification: 
 
Recipient Understood Notice: Yes
Recipient Signature: Yes
Med Rec Note Co-signed by Attending:
 
Coverage Notice Comment:  
 
Last DP export: 19   8:43 am
Patient Name: NEVILLE ESTEBAN
Encounter #: [id number]
Page [id number]
 
 
 
 
 
Electronically Signed by ANA TOBAR on 19 at 0951
 
 
 
 
 
 
**All edits/amendments must be made on the electronic document**
 
DICTATION DATE: 19     : KERON  19     
RPT#: [id number]                                DC DATE:        
                                               STATUS: ADM IN  
[facility]
[address] [place] [address]
***END OF REPORT***

## 2019-08-10 VITALS — DIASTOLIC BLOOD PRESSURE: 80 MMHG | SYSTOLIC BLOOD PRESSURE: 123 MMHG

## 2019-08-10 VITALS — DIASTOLIC BLOOD PRESSURE: 55 MMHG | SYSTOLIC BLOOD PRESSURE: 112 MMHG

## 2019-08-10 VITALS — DIASTOLIC BLOOD PRESSURE: 79 MMHG | SYSTOLIC BLOOD PRESSURE: 127 MMHG

## 2019-08-10 VITALS — DIASTOLIC BLOOD PRESSURE: 75 MMHG | SYSTOLIC BLOOD PRESSURE: 127 MMHG

## 2019-08-10 LAB
ANION GAP SERPL CALC-SCNC: 5.3 MMOL/L (ref 8–16)
BUN SERPL-MCNC: 21 MG/DL (ref 7–18)
CALCIUM SERPL-MCNC: 8.3 MG/DL (ref 8.5–10.1)
CHLORIDE SERPL-SCNC: 104 MMOL/L (ref 98–107)
CO2 SERPL-SCNC: 35.1 MMOL/L (ref 21–32)
CREAT SERPL-MCNC: 0.8 MG/DL (ref 0.6–1.3)
EOSINOPHIL NFR BLD: 10 % (ref 0–7)
ERYTHROCYTE [DISTWIDTH] IN BLOOD BY AUTOMATED COUNT: 16.1 % (ref 11.5–14.5)
GLUCOSE SERPL-MCNC: 84 MG/DL (ref 74–106)
HCT VFR BLD CALC: 43.5 % (ref 42–54)
HGB BLD-MCNC: 13.2 G/DL (ref 13.5–17.5)
LYMPHOCYTES NFR BLD AUTO: 2 % (ref 15–50)
MAGNESIUM SERPL-MCNC: 2.1 MG/DL (ref 1.8–2.4)
MCH RBC QN AUTO: 27 PG (ref 26–34)
MCHC RBC AUTO-ENTMCNC: 30.3 G/DL (ref 31–37)
MCV RBC: 89 FL (ref 80–100)
MONOCYTES NFR BLD: 12 % (ref 2–11)
NEUTROPHILS NFR BLD AUTO: 74 % (ref 40–80)
OSMOLALITY SERPL CALC.SUM OF ELEC: 280 MOSM/KG (ref 275–300)
PHOSPHATE SERPL-MCNC: 3.5 MG/DL (ref 2.5–4.9)
PLATELET # BLD EST: NORMAL 10*3/UL
PLATELET # BLD: 291 10X3/UL (ref 130–400)
PMV BLD AUTO: 12.6 FL (ref 7.4–10.4)
POTASSIUM SERPL-SCNC: 4.4 MMOL/L (ref 3.5–5.1)
RBC # BLD AUTO: 4.89 10X6/UL (ref 4.2–6.1)
SODIUM SERPL-SCNC: 140 MMOL/L (ref 136–145)
WBC # BLD AUTO: 22.1 10X3/UL (ref 4.8–10.8)

## 2019-08-10 NOTE — NUR
PT CALLED ME TO GET HIM MORE WATER, BROUGHT PT LARGE CUP OF WATER PT THEN
COMPLAINED THE CUP WAS TOO LARGE AND SENT ME BACK FOR A SMALLER CUP. PT
REARANGED. ALL QUESTIONS ANSWERED. CL IN REACH, SRX2,

## 2019-08-10 NOTE — NUR
RECEIVED REPORT, WILL ASSUME CARE OF PT, ASKING FOR ZOFRAN(GAVE AS ORDERED),
BED IS LOW, SRX2, CALL LIGHT IN REACH, WILL CONTINUE PLAN OF CARE

## 2019-08-10 NOTE — NUR
PT AWAKE AND OREITED, REQUESTED BED PAN. HAD DIAHREAH. PT WAS VERY CONCERNED
WITH BLOOD BEING PRESENT IN THE STOOL, I INFORMED THE PT THAT NO BLOOD WAS
PRESENT. PT STATES HE'S RELIEVED TO HEAR IT. NO COMPLAINTS, ALL QUESTIONS
ANSWERED. NO ACUTE DISTRESS NTOED. CL IN REACH, SRX2.

## 2019-08-11 VITALS — DIASTOLIC BLOOD PRESSURE: 71 MMHG | SYSTOLIC BLOOD PRESSURE: 114 MMHG

## 2019-08-11 VITALS — SYSTOLIC BLOOD PRESSURE: 120 MMHG | DIASTOLIC BLOOD PRESSURE: 69 MMHG

## 2019-08-11 VITALS — DIASTOLIC BLOOD PRESSURE: 67 MMHG | SYSTOLIC BLOOD PRESSURE: 112 MMHG

## 2019-08-11 VITALS — SYSTOLIC BLOOD PRESSURE: 113 MMHG | DIASTOLIC BLOOD PRESSURE: 72 MMHG

## 2019-08-11 VITALS — SYSTOLIC BLOOD PRESSURE: 106 MMHG | DIASTOLIC BLOOD PRESSURE: 68 MMHG

## 2019-08-11 LAB
ALBUMIN SERPL-MCNC: 2.3 G/DL (ref 3.4–5)
ALP SERPL-CCNC: 96 U/L (ref 46–116)
ALT SERPL-CCNC: 7 U/L (ref 10–68)
ANION GAP SERPL CALC-SCNC: 5.7 MMOL/L (ref 8–16)
BILIRUB SERPL-MCNC: 0.74 MG/DL (ref 0.2–1.3)
BUN SERPL-MCNC: 21 MG/DL (ref 7–18)
CALCIUM SERPL-MCNC: 8.2 MG/DL (ref 8.5–10.1)
CHLORIDE SERPL-SCNC: 104 MMOL/L (ref 98–107)
CO2 SERPL-SCNC: 35.5 MMOL/L (ref 21–32)
CREAT SERPL-MCNC: 0.8 MG/DL (ref 0.6–1.3)
ERYTHROCYTE [DISTWIDTH] IN BLOOD BY AUTOMATED COUNT: 15.9 % (ref 11.5–14.5)
GLOBULIN SER-MCNC: 4.1 G/L
GLUCOSE SERPL-MCNC: 77 MG/DL (ref 74–106)
HCT VFR BLD CALC: 45.2 % (ref 42–54)
HGB BLD-MCNC: 13.4 G/DL (ref 13.5–17.5)
LYMPHOCYTES NFR BLD AUTO: 2.7 % (ref 15–50)
MAGNESIUM SERPL-MCNC: 2.1 MG/DL (ref 1.8–2.4)
MCH RBC QN AUTO: 26.8 PG (ref 26–34)
MCHC RBC AUTO-ENTMCNC: 29.6 G/DL (ref 31–37)
MCV RBC: 90.4 FL (ref 80–100)
NEUTROPHILS NFR BLD AUTO: 79.4 % (ref 40–80)
OSMOLALITY SERPL CALC.SUM OF ELEC: 282 MOSM/KG (ref 275–300)
PHOSPHATE SERPL-MCNC: 3.5 MG/DL (ref 2.5–4.9)
PLATELET # BLD: 277 10X3/UL (ref 130–400)
PMV BLD AUTO: 12.6 FL (ref 7.4–10.4)
POTASSIUM SERPL-SCNC: 4.2 MMOL/L (ref 3.5–5.1)
PROT SERPL-MCNC: 6.4 G/DL (ref 6.4–8.2)
RBC # BLD AUTO: 5 10X6/UL (ref 4.2–6.1)
SODIUM SERPL-SCNC: 141 MMOL/L (ref 136–145)
WBC # BLD AUTO: 19.6 10X3/UL (ref 4.8–10.8)

## 2019-08-11 NOTE — NUR
PT CALLED ME INTO ROOM WITH C/O NOT BEING ABLE TO SLEEP AT NIGHT DUE TO
SEVERAL THINGS. THOSE THINGS INCLUDE BUT ARE NOT LIMMITED TO; VITAL SIGNS AND
MIDNIGHT MEDICATIONS, THE PT NEXT DOOR SNORING TOO LOUD, THE NURSES BEING TOO
LOUD AND GIGGLING AT THE NURSES STATION ALL NIGHT. PT REQUESTED THAT HE GET NO
2ND VITAL SIGNS TONIGHT, NO MIDDLE OF THE NIGHT MEDICATION, AND NO OTHER
DISTURBANCES UNLESS HE PUSHES HIS CALL LIGHT AND REQUESTS IT. WILL FORWARD
MESSAGE TO NIGHT SHIFT. CL IN REACH, SRX2.

## 2019-08-11 NOTE — NUR
PT DAUGHTER CALLED, PT APPROVED US TO SPEAK TO HER. DAUGHTERS NAME IS JAYSON HODGSONSELIN; [phone]

## 2019-08-11 NOTE — NUR
TRIED TO CONVINCE PT TO GET UP AND OUT OF BED FOR LUNCH/ PT REFUSED. PT
DAUGHTER CALLED AND STTATEED THAT THE PT HAD BEEN CALLING HER MAKING IT SOUND
AS IF HE WERE ABOUT TO DIE, I INFORMED THE DAUGHTER THAT AT THIS TIME, WE HAD
NO REASON TO SUSPECT AND IMINENT DEATH. I ASKED THE PT ABOUT THIS, HE STATED
HE THOUGHT HE MIGHT BECAUSE HE'S 80 YEARS OLD. I TOLD THE PT THAT BY HIS LABS
AND SO ON, HE WAS MEDICALLY IMPROVING, PT STATES THAT HE IS RELIEVED TO HEAR
THAT. INFORMED THE PT THAT HE WOULD FEEL BETTER FASTER IF HE GOT OUT OF BED
AND WORKED WITH US AND PHYSICAL THERAPY, AND THAT IF HE WOULD TAKE HIS
PRESCRIBED MEDICATIONS. PT STILL REFUSED TO DO EITHER OF THOSE THINGS STATING
HE KNOWS HIS BODY BETTER THAN US. PT UP IN BED EATING LUNCH AT THIS TIME.

## 2019-08-11 NOTE — NUR
RECEIVED REPORT, WILL ASSUME CARE OF PT, PT WATCHING TV, DENIES ANY NEEDS AT
THIS TIME, BED IS LOW, SRX3, CALL LIGHT IN REACH, WILL CONTINUE PLAN OF CARE

## 2019-08-11 NOTE — NUR
PT AWAKE AND ORIENTED, C/O NOT GETTING ENOUGH SLEEP. PT WANTED TO HAVE A B.M,
ASSISTED WITH BEDPAN PT TOLD ME NO HE WOULD BE GOING TO THE TOILET. ASSISTED
PT WITH STANDING/WALKINGT TO COMMODE. PT WALKED WELL, BUT WAS UNSTEADY WHEN
STANDING STILL. PT HAD SMALL BRIGHT RED BLOODY, WATERY STOOL. WILL REPORT TO
DR. WAGONER IN REACH, SRX2. PT BACK IN BED RESTING COMFORTABLLY.

## 2019-08-12 VITALS — SYSTOLIC BLOOD PRESSURE: 125 MMHG | DIASTOLIC BLOOD PRESSURE: 63 MMHG

## 2019-08-12 VITALS — DIASTOLIC BLOOD PRESSURE: 62 MMHG | SYSTOLIC BLOOD PRESSURE: 114 MMHG

## 2019-08-12 VITALS — SYSTOLIC BLOOD PRESSURE: 106 MMHG | DIASTOLIC BLOOD PRESSURE: 59 MMHG

## 2019-08-12 VITALS — DIASTOLIC BLOOD PRESSURE: 62 MMHG | SYSTOLIC BLOOD PRESSURE: 123 MMHG

## 2019-08-12 VITALS — DIASTOLIC BLOOD PRESSURE: 65 MMHG | SYSTOLIC BLOOD PRESSURE: 118 MMHG

## 2019-08-12 VITALS — DIASTOLIC BLOOD PRESSURE: 68 MMHG | SYSTOLIC BLOOD PRESSURE: 112 MMHG

## 2019-08-12 LAB
ALBUMIN SERPL-MCNC: 2.2 G/DL (ref 3.4–5)
ALP SERPL-CCNC: 96 U/L (ref 46–116)
ALT SERPL-CCNC: 9 U/L (ref 10–68)
ANION GAP SERPL CALC-SCNC: 5.2 MMOL/L (ref 8–16)
BILIRUB SERPL-MCNC: 0.68 MG/DL (ref 0.2–1.3)
BUN SERPL-MCNC: 22 MG/DL (ref 7–18)
CALCIUM SERPL-MCNC: 8.1 MG/DL (ref 8.5–10.1)
CHLORIDE SERPL-SCNC: 104 MMOL/L (ref 98–107)
CO2 SERPL-SCNC: 36 MMOL/L (ref 21–32)
CREAT SERPL-MCNC: 0.8 MG/DL (ref 0.6–1.3)
EOSINOPHIL NFR BLD: 8 % (ref 0–7)
ERYTHROCYTE [DISTWIDTH] IN BLOOD BY AUTOMATED COUNT: 16.1 % (ref 11.5–14.5)
GLOBULIN SER-MCNC: 4.1 G/L
GLUCOSE SERPL-MCNC: 75 MG/DL (ref 74–106)
HCT VFR BLD CALC: 45.1 % (ref 42–54)
HGB BLD-MCNC: 13.5 G/DL (ref 13.5–17.5)
LYMPHOCYTES NFR BLD AUTO: 4 % (ref 15–50)
MCH RBC QN AUTO: 26.5 PG (ref 26–34)
MCHC RBC AUTO-ENTMCNC: 29.9 G/DL (ref 31–37)
MCV RBC: 88.6 FL (ref 80–100)
MONOCYTES NFR BLD: 8 % (ref 2–11)
NEUTROPHILS NFR BLD AUTO: 80 % (ref 40–80)
OSMOLALITY SERPL CALC.SUM OF ELEC: 282 MOSM/KG (ref 275–300)
PLATELET # BLD EST: NORMAL 10*3/UL
PLATELET # BLD: 295 10X3/UL (ref 130–400)
PMV BLD AUTO: 12.7 FL (ref 7.4–10.4)
POTASSIUM SERPL-SCNC: 4.2 MMOL/L (ref 3.5–5.1)
PROT SERPL-MCNC: 6.3 G/DL (ref 6.4–8.2)
RBC # BLD AUTO: 5.09 10X6/UL (ref 4.2–6.1)
SODIUM SERPL-SCNC: 141 MMOL/L (ref 136–145)
WBC # BLD AUTO: 22.8 10X3/UL (ref 4.8–10.8)

## 2019-08-12 NOTE — NUR
OT NOTE: PT COMPLETED BED MOB WITH CGA FOR SUPINE TO SIT. PT COMPLETED SIDE
STEPPING WITH CGA. PT COMPLETED FACE WASH AND ORAL HYGIENE WITH SET UP. PT HAD
PILL IN BED IN AM. NOTIFIED NURSING STAFF.
THANK YOU, MCKENZIE KIDD

## 2019-08-12 NOTE — NUR
THE PATIENT ONLY AGREED TO TAKE SOME OF HIS MEDICATIONS. WITH A LOT OF
ENCOURAGEMENT AND EXPLANATING, HE AGREED TO TAKE 3 INCLUDING LOVENOX
INJECTION. HE TRIED TO "EXPLAIN" WHY ON EACH MEDICATION. CALL LIGHT IN REACH

## 2019-08-12 NOTE — NUR
INITIAL ROUNDING, BEDSIDE SHIFT REPORT COMPLETE. WHITE BOARD UPDATED. PATIENT
IS AWAKE, SUPINE IN THE BED, REPORTS PAIN OF 20/10 ALL OVER, O2 VIA NC IN
PLACE. HOB AT 20 DEGREES, CALL LIGHT IN REACH

## 2019-08-12 NOTE — NUR
OT NOTE: PT COMPLETED ADL MOB WITH CGA . PT COMPLETED BED MOB WITH SBA. PT
COMPLETED TOILETING AND HYGIENE TASKS WITH SBA. IN PM, PT HAD PILL IN BED.
RINCON NOTIFIED NURSING.
THANK YOU, MCKENZIE KIDD

## 2019-08-13 VITALS — DIASTOLIC BLOOD PRESSURE: 79 MMHG | SYSTOLIC BLOOD PRESSURE: 119 MMHG

## 2019-08-13 VITALS — DIASTOLIC BLOOD PRESSURE: 72 MMHG | SYSTOLIC BLOOD PRESSURE: 122 MMHG

## 2019-08-13 VITALS — DIASTOLIC BLOOD PRESSURE: 74 MMHG | SYSTOLIC BLOOD PRESSURE: 117 MMHG

## 2019-08-13 VITALS — DIASTOLIC BLOOD PRESSURE: 67 MMHG | SYSTOLIC BLOOD PRESSURE: 116 MMHG

## 2019-08-13 VITALS — DIASTOLIC BLOOD PRESSURE: 64 MMHG | SYSTOLIC BLOOD PRESSURE: 120 MMHG

## 2019-08-13 VITALS — SYSTOLIC BLOOD PRESSURE: 128 MMHG | DIASTOLIC BLOOD PRESSURE: 69 MMHG

## 2019-08-13 LAB
%HYPO/RBC NFR BLD AUTO: (no result) %
ALBUMIN SERPL-MCNC: 2.1 G/DL (ref 3.4–5)
ALP SERPL-CCNC: 98 U/L (ref 46–116)
ALT SERPL-CCNC: 6 U/L (ref 10–68)
ANION GAP SERPL CALC-SCNC: 9.7 MMOL/L (ref 8–16)
ANISOCYTOSIS BLD QL SMEAR: (no result)
BILIRUB SERPL-MCNC: 0.71 MG/DL (ref 0.2–1.3)
BUN SERPL-MCNC: 22 MG/DL (ref 7–18)
CALCIUM SERPL-MCNC: 8.2 MG/DL (ref 8.5–10.1)
CHLORIDE SERPL-SCNC: 105 MMOL/L (ref 98–107)
CO2 SERPL-SCNC: 34.4 MMOL/L (ref 21–32)
CREAT SERPL-MCNC: 0.9 MG/DL (ref 0.6–1.3)
EOSINOPHIL NFR BLD: 9 % (ref 0–7)
ERYTHROCYTE [DISTWIDTH] IN BLOOD BY AUTOMATED COUNT: 16.2 % (ref 11.5–14.5)
GLOBULIN SER-MCNC: 4 G/L
GLUCOSE SERPL-MCNC: 76 MG/DL (ref 74–106)
HCT VFR BLD CALC: 43.2 % (ref 42–54)
HGB BLD-MCNC: 13.2 G/DL (ref 13.5–17.5)
LYMPHOCYTES NFR BLD AUTO: 2 % (ref 15–50)
MCH RBC QN AUTO: 26.5 PG (ref 26–34)
MCHC RBC AUTO-ENTMCNC: 30.6 G/DL (ref 31–37)
MCV RBC: 86.7 FL (ref 80–100)
MONOCYTES NFR BLD: 19 % (ref 2–11)
NEUTROPHILS NFR BLD AUTO: 68 % (ref 40–80)
OSMOLALITY SERPL CALC.SUM OF ELEC: 290 MOSM/KG (ref 275–300)
PLATELET # BLD EST: NORMAL 10*3/UL
PLATELET # BLD: 289 10X3/UL (ref 130–400)
PMV BLD AUTO: 12.6 FL (ref 7.4–10.4)
POTASSIUM SERPL-SCNC: 4.1 MMOL/L (ref 3.5–5.1)
PROT SERPL-MCNC: 6.1 G/DL (ref 6.4–8.2)
RBC # BLD AUTO: 4.98 10X6/UL (ref 4.2–6.1)
ROULEAUX BLD QL SMEAR: (no result)
SODIUM SERPL-SCNC: 145 MMOL/L (ref 136–145)
WBC # BLD AUTO: 22.4 10X3/UL (ref 4.8–10.8)

## 2019-08-13 NOTE — MORECARE
CASE MANAGEMENT DISCHARGE SUMMARY
 
 
PATIENT: NEVILLE ESTEBAN                        UNIT: [id number]
ACCOUNT#: [id number]                       ADM DATE: 19
AGE: 79     : 40  SEX: M            ROOM/BED: D.2132    
AUTHOR: AMADOU,DOC                             PHYSICIAN:                               
 
REFERRING PHYSICIAN: GARRISON BECERRA MD               
DATE OF SERVICE: 19
Discharge Plan
 
 
Patient Name: NEVILLE ESTEBAN
Facility: [facility]:[place]
Encounter #: [id number]
Medical Record #: [id number]
: 1940
Planned Disposition: Skilled Nursing Facility
Anticipated Discharge Date: 19
 
Discharge Date: 
Expected LOS: 8
Initial Reviewer: [id number]
Initial Review Date: 2019
Generated: 19  11:01 am 
Comments
 
DCP- Discharge Planning
 
Updated by [id number]: Migel Vallecillo on 19   8:57 am CT
Patient Name:  NEVILLE ESTEBAN   
Encounter No:  [id number]   
:  1940   
Primary Insurance:  MEDICARE PART A ONLY  
Anticipated DC Date: 2019   
Planned Disposition:  Skilled Nursing Facility  
External Planned Provider:[facility], MEDICARE REHAB 
BED  
  
  
DCP follow-up note: CM FAXED HOSPITAL UPDATE TO [facility] AT 
[phone].   
  
FOR DISCHARGE, FAX DISCHARGE INFORMATION TO THE [facility], [phone], 
NURSE REPORT TO BE CALLED TO THE [facility] [phone]. [facility] TO 
ARRANGE VAN TRANSPORTATION.  
  
Migel Vallecillo, CASE MANAGEMENT
DCP- Discharge Planning
 
 
Updated by [id number]: Migel Vallecillo on 19   8:44 am CT
Patient Name:  NEVILLE ESTEBAN   
Encounter No:  [id number]   
:  1940   
Primary Insurance:  MEDICARE PART A ONLY  
Anticipated DC Date: 2019   
Planned Disposition:  Skilled Nursing Facility  
External Planned Provider: [facility] MEDICARE REHAB 
BED  
  
  
DCP follow-up note: ON 19 CM SPOKE TO [facility], [phone], 
PT HAS BEEN ACCEPTED.  NEEL FLOWERS NOTIFIED, CM ADVISED PT HAD FEVER AND 
ELEVATED WBC'S, NOT READY TO DC 19.  CM NOTIFIED LASHAWN OF THE [facility].  
  
FOR DISCHARGE, FAX DISCHARGE INFORMATION TO THE [facility], [phone], 
NURSE REPORT TO BE CALLED TO THE [facility] [phone]. [facility] TO 
ARRANGE VAN TRANSPORTATION.  
  
Migel Vallecillo, CASE MANAGEMENT  
  
Appended by Migel Vallecillo on 2019 9:44 CDT:  
CM NOTIFIED PT OF THE [facility] ACCEPTANCE, PT IN AGREEMENT WITH DISCHARGE TO THE 
[facility]. IMPORTANT MESSAGE FROM MEDICARE PROVIDED AND EXPLAINED.  
  
FOR DISCHARGE, FAX DISCHARGE INFORMATION TO THE [facility], [phone], 
NURSE REPORT TO BE CALLED TO THE [facility] [phone]. [facility] TO 
ARRANGE VAN TRANSPORTATION.  
  
Migel Vallecillo CASE MANAGEMENT
DCP- Discharge Planning
 
Updated by [id number]: Migel Vallecillo on 19   8:10 am CT
Patient Name:  NEVILLE ESTEBAN   
Encounter No:  [id number]   
:  1940   
Primary Insurance:  MEDICARE PART A ONLY  
Anticipated DC Date: 2019   
Planned Disposition:  Skilled Nursing Facility  
External Planned Provider: THE [facility] NURSING AND REHAB, MEDICARE REHAB BED  
  
  
DCP follow-up note: CM SPOKE TO LASHAWN, PT HAS BEEN ACCEPTED CLINICALLY FOR 
REHAB AT THE [facility] WITH TRANSITION TO LONG TERM CARE.  PT WILL HAVE TO 
PROVIDE FINANCIAL INFORMATION FOR MEDICAID APPLICATION AS PT IS NOT ELIGIBLE 
FOR VA NURSING HOME BENEFITS.  CM FAXED UPDATE TO [place] FOR [facility] AT 
[phone]. CM SPOKE TO PT IN ROOM, INFORMED PT THAT WITHOUT PARTICIPATION 
IN THERAPY SERVICES, PLACEMENT MAY NOT BE POSSIBLE.  PT PROMISED TO 
PARTICIPATE IN HOSPITAL THERAPY SERVICES.  
  
CM WAITING PT'S PARTICIPATION WITH PHYSICAL THERAPY EVALUATION AND THERAPY 
 
SERVICES AS WELL AS FINANCIAL ARRANGEMENTS FOR PLACEMENT AT THE [facility].  
  
Migel Vallecillo., CASE MANAGEMENT
DCP- Discharge Planning
 
Updated by [id number]: Migel Vallecillo on 19   2:05 pm CT
Patient Name: NEVILLE ESTEBAN                                     
Admission Status: ER   
Accout number: [id number]                              
Admission Date: 2019   
: 1940                                                        
Admission Diagnosis:UNSPECIFIED ABDOMINAL PAIN   
Attending: GARRISON BECERRA                                                
Current LOS:  5   
  
Anticipated DC Date:    
Planned Disposition: Skilled Nursing Facility   
Primary Insurance: MEDICARE PART A ONLY   
PLANNED EXTERNAL PROVIDER :  THE [facility] NURSING AND REHAB, MEDICARE REHAB BED 
  
Discharge Planning Comments:   
CM RECEIVED REQUEST FROM PT THROUGH BEDSIDE NURSE TO SPEAK TO PT REGARDING 
DISCHARGE PLANNING.  CM MET WITH PT IN ROOM TO DISCUSS DISCHARGE PLANNING AND 
NEEDS. PT REPORTS LIVING AT HOME INDEPENDENTLY AND ALONE.  PT [facility].  PT HAS HAD NO OUTSIDE SERVICES ASSISTING IN THE HOME. CM 
DISCUSSED AVAILABILITY OF HOME HEALTH, REHAB SERVICES AND MEDICAL EQUIPMENT. 
PT REPORTS HE IS NOT ABLE TO TAKE CARE OF HIMSELF ANY LONGER.  PT WANTS CM TO 
PLACE HIM SOMEWHERE FOR LONG TERM CARE.  PT REPORTS HE IS NOT ABLE TO GET OUT 
OF BED WITHOUT ASSISTANCE.  PT RECEIVES $1030 IN SOCIAL SECURITY AND $130 IN 
VETERANS DISABILITY MONTHLY.  PT WOULD ONLY GO TO A VA HOME AS A LAST RESORT 
AND IS ONLY 10% SERVICE CONNECTED WITH DISABILITY.  PT DENIES HAVING ANY 
OTHER ASSETS.  PT LIN WITH [facility] IN [place].  PT REPORTS BEING PAID UP 
FOR THE REST OF THE MONTH'S RENT AT THE [facility] 
[facility], [address]ART[address].  CM DISCUSSED SKILLED NURSING FACILITY FOR REHAB, PT 
REPORTS WILLINGNESS FOR REHAB BUT STATES HE NEEDS LONG TERM CARE TOO.  
LISTING OF NURSING HOMES PROVIDED, PT SIGNED CONSENT FOR NO SKILLED NURSING 
FACILITY PREFERENCE.  IMPORTANT MESSAGE FROM MEDICARE PROVIDED AND EXPLAINED. 
  
CM NOTIFIED LASHAWN OF [facility] OF REHAB AND LONG TERM CARE REFERRAL, 
[phone].  CM FAXED REFERRAL TO LASHAWN OF THE [facility] [phone].  
  
CM WAITING ADMISSION DETERMINATION FROM THE [facility] NURSING AND REHAB FOR REHAB 
AND LONG TERM CARE PLACEMENT.  
  
: Migel Vallecillo
 DCPIA - Discharge Planning Initial Assessment
 
Updated by [id number]: Migel Vallecillo on 19   2:58 pm
*  Is the patient Alert and Oriented?
Yes
*  How many steps to enter\exit or inside your home? ELEVATOR *  PCP DR. MEJIA - [facility] CLINIC
 
*  Pharmacy
[facility] MAIL ORDER
 
*  Preadmission Environment
Home Alone
*  ADLs
Independent
*  Equipment
Walker
*  Other Equipment
[employer] MEDICAL EQUIPMENT PROVIDER
*  List name and contact numbers for known caregivers / representatives who 
currently or will assist patient after discharge:
RANULFO BLANCA, FRIEND, [phone]
*  Additional services required to return to the preadmission environment?
No
*  Can the patient safely return to the preadmission environment?
No
*  Has this patient been hospitalized within the prior 30 days at any 
hospital?
No
 
 
 
 
 
Coverage Notice
 
Reviewer: MAVERICK Vallecillo
 
Notice Issued Date-Time: 2019   9:35
Notice Type: IM Discharge Notice
 
Notice Delivered To: Patient
Relationship to Patient: 
Representative Name: 
 
Delivery Method: HAND - Hand Delivered
Emili Days:
Prior Verbal Notification: 
 
Recipient Understood Notice: Yes
Recipient Signature: Yes
Med Rec Note Co-signed by Attending:
 
Coverage Notice Comment:  
Reviewer: MAVERICK Vallecillo
 
Notice Issued Date-Time: 2019  13:30
Notice Type: IM Discharge Notice
 
Notice Delivered To: Patient
 
Relationship to Patient: 
Representative Name: 
 
Delivery Method: HAND - Hand Delivered
Emili Days:
Prior Verbal Notification: 
 
Recipient Understood Notice: Yes
Recipient Signature: Yes
Med Rec Note Co-signed by Attending:
 
Coverage Notice Comment:  
Reviewer: MAVERICK Vallecillo
 
Notice Issued Date-Time: 2019  13:30
Notice Type: Patient Choice Letter
 
Notice Delivered To: Patient
Relationship to Patient: 
Representative Name: 
 
Delivery Method: HAND - Hand Delivered
Emili Days:
Prior Verbal Notification: 
 
Recipient Understood Notice: Yes
Recipient Signature: Yes
Med Rec Note Co-signed by Attending:
 
Coverage Notice Comment:  NO SKILLED NURSING FACILITY PREFERENCE
 
Last DP export: 19   8:19 a
Patient Name: NEVILLE ESTEBAN
Encounter #: [id number]
Page [id number]
 
 
 
 
 
Electronically Signed by ANA TOBAR on 19 at 1001
 
 
 
 
 
 
**All edits/amendments must be made on the electronic document**
 
DICTATION DATE: 19 1000     : DM  19 1000     
RPT#: [id number]                                DC DATE:        
                                               STATUS: ADM IN  
[facility]
[address] [place] [address]
***END OF REPORT***

## 2019-08-13 NOTE — NUR
OT NOTE: PT SUPINE IN BED. PT REFUSED TO STAND OR SIT AT EOB. PT STATED HIS
RIGHT HIP HURT BUT DENIED A FALL. PT COMPLETED BED MOB TASKS WITH MAX A.
PREVIOUS DAY PT ONLY REQUIRED CGA FOR ADL MOB. PT COMPLETED FACE WASH WITH SET
UP. RINCON NOTIFIED NURSING THAT PT HAD C/O R HIP PAIN AND THAT PT STATED HIS
MEDICATIONS ARE KILLING HIM.
THANK YOU, MCKENZIE KIDD

## 2019-08-13 NOTE — NUR
IV INFILTRATED. D/C WITH CATHETER TIP INTACT. IV RESITED TO RIGHT UPPER ARM.
DRESSING CDI. IV PATENT.

## 2019-08-13 NOTE — NUR
PT RESTING. RR EVEN AND UNLABORED. NO DISTRESS NOTED. DENIES NEEDS AT THIS
TIME. WILL CONTINUE TO MONITOR.

## 2019-08-13 NOTE — MORECARE
CASE MANAGEMENT DISCHARGE SUMMARY
 
 
PATIENT: NEVILLE ESTEBAN                        UNIT: [id number]
ACCOUNT#: [id number]                       ADM DATE: 19
AGE: 79     : 40  SEX: M            ROOM/BED: D.2132    
AUTHOR: AMADOU,DOC                             PHYSICIAN:                               
 
REFERRING PHYSICIAN: GARRISON BECERRA MD               
DATE OF SERVICE: 19
Discharge Plan
 
 
Patient Name: NEVILLE ESTEBAN
Facility: [facility]:[place]
Encounter #: [id number]
Medical Record #: [id number]
: 1940
Planned Disposition: Skilled Nursing Facility
Anticipated Discharge Date: 19
 
Discharge Date: 
Expected LOS: 8
Initial Reviewer: [email or web address]
Initial Review Date: 2019
Generated: 19  10:19 am 
DCP- Discharge Planning
 
Updated by [id number]: Migel Vallecillo on 19   8:44 am CT
Patient Name:  NEVILLE ESTEBAN   
Encounter No:  [id number]   
:  1940   
Primary Insurance:  MEDICARE PART A ONLY  
Anticipated DC Date: 2019   
Planned Disposition:  Skilled Nursing Facility  
External Planned Provider: THE [facility], MEDICARE REHAB 
BED  
  
  
DCP follow-up note: ON 19 CM SPOKE TO [place] OF THE [facility], [phone], 
PT HAS BEEN ACCEPTED.  NEEL FLOWERS NOTIFIED, CM ADVISED PT HAD FEVER AND 
ELEVATED WBC'S, NOT READY TO DC 19.  CM NOTIFIED LASHAWN OF THE [facility].  
  
FOR DISCHARGE, FAX DISCHARGE INFORMATION TO THE [facility], [phone], 
NURSE REPORT TO BE CALLED TO THE [facility] [phone]. [facility] TO 
ARRANGE VAN TRANSPORTATION.  
  
Migel Vallecillo, CASE MANAGEMENT  
  
Appended by Migel Vallecillo on 2019 9:44 CDT:  
 
CM NOTIFIED PT OF THE [facility] ACCEPTANCE, PT IN AGREEMENT WITH DISCHARGE TO THE 
[facility]. IMPORTANT MESSAGE FROM MEDICARE PROVIDED AND EXPLAINED.  
  
FOR DISCHARGE, FAX DISCHARGE INFORMATION TO THE [facility], [phone], 
NURSE REPORT TO BE CALLED TO THE [facility] [phone]. [facility] TO 
ARRANGE VAN TRANSPORTATION.  
  
Migel Vallecillo CASE MANAGEMENT
DCP- Discharge Planning
 
Updated by [id number]: Migel Vallecillo on 19   8:10 am CT
Patient Name:  NEVILLE ESTEBAN   
Encounter No:  [id number]   
:  1940   
Primary Insurance:  MEDICARE PART A ONLY  
Anticipated DC Date: 2019   
Planned Disposition:  Skilled Nursing Facility  
External Planned Provider: THE [facility] NURSING AND REHAB, MEDICARE REHAB BED  
  
  
DCP follow-up note: CM SPOKE TO LASHAWN, PT HAS BEEN ACCEPTED CLINICALLY FOR 
REHAB AT THE [facility] WITH TRANSITION TO LONG TERM CARE.  PT WILL HAVE TO 
PROVIDE FINANCIAL INFORMATION FOR MEDICAID APPLICATION AS PT IS NOT ELIGIBLE 
FOR [facility] NURSING HOME BENEFITS.  CM FAXED UPDATE TO LASHAWN FOR THE [facility] AT 
[phone]. CM SPOKE TO PT IN ROOM, INFORMED PT THAT WITHOUT PARTICIPATION 
IN THERAPY SERVICES, PLACEMENT MAY NOT BE POSSIBLE.  PT PROMISED TO 
PARTICIPATE IN HOSPITAL THERAPY SERVICES.  
  
CM WAITING PT'S PARTICIPATION WITH PHYSICAL THERAPY EVALUATION AND THERAPY 
SERVICES AS WELL AS FINANCIAL ARRANGEMENTS FOR PLACEMENT AT THE [facility].  
  
Migel Vallecillo., CASE MANAGEMENT
DCP- Discharge Planning
 
Updated by [id number]: Migel Vallecillo on 19   2:05 pm CT
Patient Name: NEVILLE ESTEBAN                                     
Admission Status: ER   
Accout number: [id number]                              
Admission Date: 2019   
: 1940                                                        
Admission Diagnosis:UNSPECIFIED ABDOMINAL PAIN   
Attending: GARRISON BECERRA                                                
Current LOS:  5   
  
Anticipated DC Date:    
Planned Disposition: Skilled Nursing Facility   
Primary Insurance: MEDICARE PART A ONLY   
PLANNED EXTERNAL PROVIDER :  THE [facility] NURSING AND REH[facility], MEDICARE REHAB BED 
  
Discharge Planning Comments:   
CM RECEIVED REQUEST FROM PT THROUGH BEDSIDE NURSE TO SPEAK TO PT REGARDING 
DISCHARGE PLANNING.  CM MET WITH PT IN ROOM TO DISCUSS DISCHARGE PLANNING AND 
 
NEEDS. PT REPORTS LIVING AT HOME INDEPENDENTLY AND ALONE.  [facility]  PT HAS HAD NO OUTSIDE SERVICES ASSISTING IN THE HOME. CM 
DISCUSSED AVAILABILITY OF HOME HEALTH, REHAB SERVICES AND MEDICAL EQUIPMENT. 
PT REPORTS HE IS NOT ABLE TO TAKE CARE OF HIMSELF ANY LONGER.  PT WANTS CM TO 
PLACE HIM SOMEWHERE FOR LONG TERM CARE.  PT REPORTS HE IS NOT ABLE TO GET OUT 
OF BED WITHOUT ASSISTANCE.  PT RECEIVES $1030 IN SOCIAL SECURITY AND $130 IN 
VETER[employer] DISABILITY MONTHLY.  PT WOULD ONLY GO TO A VA HOME AS A LAST RESORT 
AND IS ONLY 10% SERVICE CONNECTED WITH DISABILITY.  PT DENIES HAVING ANY 
OTHER ASSETS.  PT LIN WITH [employer] IN [place].  PT REPORTS BEING PAID UP 
FOR THE REST OF THE MONTH'S RENT AT THE [place] HOUSING AUTHORITY 
[facility], APART[address].  CM DISCUSSED SKILLED NURSING FACILITY FOR REHAB, PT 
REPORTS WILLINGNESS FOR REHAB BUT STATES HE NEEDS LONG TERM CARE TOO.  
LISTING OF NURSING HOMES PROVIDED, PT SIGNED CONSENT FOR NO SKILLED NURSING 
FACILITY PREFERENCE.  IMPORTANT MESSAGE FROM MEDICARE PROVIDED AND EXPLAINED. 
  
CM NOTIFIED LASHAWN OF [facility] OF REHAB AND LONG TERM CARE REFERRAL, 
[phone].  CM FAXED REFERRAL TO LASHAWN OF THE [facility] [phone].  
  
CM WAITING ADMISSION DETERMINATION FROM THE [facility] NURSING AND REHAB FOR REHAB 
AND LONG TERM CARE PLACEMENT.  
  
: Migel Vallecillo
 DCPIA - Discharge Planning Initial Assessment
 
Updated by [email or web address]: Migel Vallecillo on 19   2:58 pm
*  Is the patient Alert and Oriented?
Yes
*  How many steps to enter\exit or inside your home? ELEVATOR *  PCP DR. MEJIA - [facility]
*  Pharmacy
[facility] MAIL ORDER
 
*  Preadmission Environment
Home Alone
*  ADLs
Independent
*  Equipment
Walker
*  Other Equipment
[employer] MEDICAL EQUIPMENT PROVIDER
*  List name and contact numbers for known caregivers / representatives who 
currently or will assist patient after discharge:
RANULFO BLANCA, FRIEND, [phone]
*  Additional services required to return to the preadmission environment?
No
*  Can the patient safely return to the preadmission environment?
No
*  Has this patient been hospitalized within the prior 30 days at any 
hospital?
No
 
 
 
 
 
 
Coverage Notice
 
Reviewer: MAVREICK Vallecillo
 
Notice Issued Date-Time: 2019  13:30
Notice Type: IM Discharge Notice
 
Notice Delivered To: Patient
Relationship to Patient: 
Representative Name: 
 
Delivery Method: HAND - Hand Delivered
Emili Days:
Prior Verbal Notification: 
 
Recipient Understood Notice: Yes
Recipient Signature: Yes
Med Rec Note Co-signed by Attending:
 
Coverage Notice Comment:  
Reviewer: MAVERICK Vallecillo
 
Notice Issued Date-Time: 2019  13:30
Notice Type: Patient Choice Letter
 
Notice Delivered To: Patient
Relationship to Patient: 
Representative Name: 
 
Delivery Method: HAND - Hand Delivered
Emili Days:
Prior Verbal Notification: 
 
Recipient Understood Notice: Yes
Recipient Signature: Yes
Med Rec Note Co-signed by Attending:
 
Coverage Notice Comment:  NO SKILLED NURSING FACILITY PREFERENCE
Reviewer: MAVERICK Vallecillo
 
Notice Issued Date-Time: 2019   9:35
Notice Type: IM Discharge Notice
 
Notice Delivered To: Patient
Relationship to Patient: 
Representative Name: 
 
Delivery Method: HAND - Hand Delivered
Emili Days:
 
Prior Verbal Notification: 
 
Recipient Understood Notice: Yes
Recipient Signature: Yes
Med Rec Note Co-signed by Attending:
 
Coverage Notice Comment:  
 
Last DP export: 19   8:51 am
Patient Name: NEVILLE ESTEBAN
Encounter #: [id number]
Page [id number]
 
 
 
 
 
Electronically Signed by ANA TOBAR on 19 at 0919
 
 
 
 
 
 
**All edits/amendments must be made on the electronic document**
 
DICTATION DATE: 19     : KERON  19     
RPT#: [id number]                                DC DATE:        
                                               STATUS: ADM IN  
[facility]
[address] [place] [address]
***END OF REPORT***

## 2019-08-13 NOTE — NUR
Nutrition Follow-up:
Pt reports fair PO intake. Drinking Ensure daily.
Diet: Regular
PO intake: 62% avg x 8 meals
No new wt
Labs reviewed
Meds reviewed
Continue current diet as tolerated. Honor food preferences. Need new wt. RD
following.

## 2019-08-14 VITALS — SYSTOLIC BLOOD PRESSURE: 131 MMHG | DIASTOLIC BLOOD PRESSURE: 68 MMHG

## 2019-08-14 VITALS — SYSTOLIC BLOOD PRESSURE: 126 MMHG | DIASTOLIC BLOOD PRESSURE: 65 MMHG

## 2019-08-14 VITALS — SYSTOLIC BLOOD PRESSURE: 128 MMHG | DIASTOLIC BLOOD PRESSURE: 71 MMHG

## 2019-08-14 VITALS — DIASTOLIC BLOOD PRESSURE: 64 MMHG | SYSTOLIC BLOOD PRESSURE: 118 MMHG

## 2019-08-14 VITALS — SYSTOLIC BLOOD PRESSURE: 112 MMHG | DIASTOLIC BLOOD PRESSURE: 67 MMHG

## 2019-08-14 VITALS — DIASTOLIC BLOOD PRESSURE: 87 MMHG | SYSTOLIC BLOOD PRESSURE: 125 MMHG

## 2019-08-14 LAB
ALBUMIN SERPL-MCNC: 2 G/DL (ref 3.4–5)
ALP SERPL-CCNC: 103 U/L (ref 46–116)
ALT SERPL-CCNC: 6 U/L (ref 10–68)
ANION GAP SERPL CALC-SCNC: 5.3 MMOL/L (ref 8–16)
BASOPHILS NFR BLD AUTO: 0.3 % (ref 0–2)
BILIRUB SERPL-MCNC: 0.79 MG/DL (ref 0.2–1.3)
BUN SERPL-MCNC: 20 MG/DL (ref 7–18)
CALCIUM SERPL-MCNC: 8.1 MG/DL (ref 8.5–10.1)
CHLORIDE SERPL-SCNC: 100 MMOL/L (ref 98–107)
CO2 SERPL-SCNC: 39.8 MMOL/L (ref 21–32)
CREAT SERPL-MCNC: 0.9 MG/DL (ref 0.6–1.3)
EOSINOPHIL NFR BLD: 7.7 % (ref 0–7)
ERYTHROCYTE [DISTWIDTH] IN BLOOD BY AUTOMATED COUNT: 16.2 % (ref 11.5–14.5)
GLOBULIN SER-MCNC: 4.8 G/L
GLUCOSE SERPL-MCNC: 79 MG/DL (ref 74–106)
HCT VFR BLD CALC: 46.2 % (ref 42–54)
HGB BLD-MCNC: 14.1 G/DL (ref 13.5–17.5)
IMM GRANULOCYTES NFR BLD: 0.9 % (ref 0–5)
LYMPHOCYTES NFR BLD AUTO: 2.6 % (ref 15–50)
MCH RBC QN AUTO: 26.3 PG (ref 26–34)
MCHC RBC AUTO-ENTMCNC: 30.5 G/DL (ref 31–37)
MCV RBC: 86 FL (ref 80–100)
MONOCYTES NFR BLD: 17.2 % (ref 2–11)
NEUTROPHILS NFR BLD AUTO: 71.3 % (ref 40–80)
OSMOLALITY SERPL CALC.SUM OF ELEC: 284 MOSM/KG (ref 275–300)
PLATELET # BLD: 312 10X3/UL (ref 130–400)
PMV BLD AUTO: 12.1 FL (ref 7.4–10.4)
POTASSIUM SERPL-SCNC: 3.1 MMOL/L (ref 3.5–5.1)
PROT SERPL-MCNC: 6.8 G/DL (ref 6.4–8.2)
RBC # BLD AUTO: 5.37 10X6/UL (ref 4.2–6.1)
SODIUM SERPL-SCNC: 142 MMOL/L (ref 136–145)
VANCOMYCIN TROUGH SERPL-MCNC: 20.5 UG/ML (ref 10–20)
WBC # BLD AUTO: 20.1 10X3/UL (ref 4.8–10.8)

## 2019-08-14 NOTE — NUR
OT NOTE: PT REQUIRED MODERATE CUES FOR PARTICIPATION. PT EXHIBITS SELF
LIMITING BEHAVIOR. PT COMPLETED ADL MOB WITH CGA. PT COMPLETED EOB SITTING
WITH SPV. PT COMPLETED FACE WASH AT EOB WITH SET UP. PT EXHIBITS DECREASED
SAFETY AWARENESS.
THANK YOU, MCKENZIE KIDD

## 2019-08-14 NOTE — NUR
PT LAYING DOWN, EYES CLOSED. RR EVEN AND UNLABORED. ASSESSMENT COMPLETE. ALERT
AND ORIENTED. SLIGHTLY AGGITATED. WILL CONTINUE TO MONITOR.

## 2019-08-14 NOTE — NUR
UPON ENTERING THE ROOM, IV WAS D/C. PT DID NOT KNOW. CATHETER TIP INTACT. NC
WAS NOT ON THE PT. PT HAD URINATED THE BED. CNA CLEANED PT. REFUSED NASAL
SPRAY AND LASIX.

## 2019-08-14 NOTE — NUR
Nutrition Consult:
Noted calorie count ordered. Will provide information as it becomes available.
Thanks for the consult!

## 2019-08-15 VITALS — DIASTOLIC BLOOD PRESSURE: 73 MMHG | SYSTOLIC BLOOD PRESSURE: 106 MMHG

## 2019-08-15 VITALS — DIASTOLIC BLOOD PRESSURE: 75 MMHG | SYSTOLIC BLOOD PRESSURE: 121 MMHG

## 2019-08-15 VITALS — DIASTOLIC BLOOD PRESSURE: 66 MMHG | SYSTOLIC BLOOD PRESSURE: 115 MMHG

## 2019-08-15 VITALS — DIASTOLIC BLOOD PRESSURE: 65 MMHG | SYSTOLIC BLOOD PRESSURE: 130 MMHG

## 2019-08-15 VITALS — SYSTOLIC BLOOD PRESSURE: 118 MMHG | DIASTOLIC BLOOD PRESSURE: 62 MMHG

## 2019-08-15 VITALS — SYSTOLIC BLOOD PRESSURE: 116 MMHG | DIASTOLIC BLOOD PRESSURE: 74 MMHG

## 2019-08-15 LAB
%HYPO/RBC NFR BLD AUTO: (no result) %
ALBUMIN SERPL-MCNC: 2.1 G/DL (ref 3.4–5)
ALP SERPL-CCNC: 106 U/L (ref 46–116)
ALT SERPL-CCNC: 11 U/L (ref 10–68)
ANION GAP SERPL CALC-SCNC: 4.9 MMOL/L (ref 8–16)
ANISOCYTOSIS BLD QL SMEAR: (no result)
BILIRUB SERPL-MCNC: 0.73 MG/DL (ref 0.2–1.3)
BUN SERPL-MCNC: 20 MG/DL (ref 7–18)
CALCIUM SERPL-MCNC: 8.2 MG/DL (ref 8.5–10.1)
CANCER AG19-9 SERPL-ACNC: 85 U/ML (ref 0–35)
CEA SERPL-MCNC: 3.8 NG/ML (ref 0–4.7)
CHLORIDE SERPL-SCNC: 100 MMOL/L (ref 98–107)
CO2 SERPL-SCNC: 39.4 MMOL/L (ref 21–32)
CREAT SERPL-MCNC: 0.9 MG/DL (ref 0.6–1.3)
EOSINOPHIL NFR BLD: 4 % (ref 0–7)
ERYTHROCYTE [DISTWIDTH] IN BLOOD BY AUTOMATED COUNT: 16.2 % (ref 11.5–14.5)
GLOBULIN SER-MCNC: 4.7 G/L
GLUCOSE SERPL-MCNC: 81 MG/DL (ref 74–106)
HCT VFR BLD CALC: 45.7 % (ref 42–54)
HGB BLD-MCNC: 14.2 G/DL (ref 13.5–17.5)
LYMPHOCYTES NFR BLD AUTO: 2 % (ref 15–50)
MCH RBC QN AUTO: 26.6 PG (ref 26–34)
MCHC RBC AUTO-ENTMCNC: 31.1 G/DL (ref 31–37)
MCV RBC: 85.7 FL (ref 80–100)
MONOCYTES NFR BLD: 4 % (ref 2–11)
NEUTROPHILS NFR BLD AUTO: 88 % (ref 40–80)
OSMOLALITY SERPL CALC.SUM OF ELEC: 282 MOSM/KG (ref 275–300)
PLATELET # BLD EST: NORMAL 10*3/UL
PLATELET # BLD: 320 10X3/UL (ref 130–400)
PMV BLD AUTO: 12.3 FL (ref 7.4–10.4)
POTASSIUM SERPL-SCNC: 3.3 MMOL/L (ref 3.5–5.1)
PROT SERPL-MCNC: 6.8 G/DL (ref 6.4–8.2)
RBC # BLD AUTO: 5.33 10X6/UL (ref 4.2–6.1)
ROULEAUX BLD QL SMEAR: (no result)
SODIUM SERPL-SCNC: 141 MMOL/L (ref 136–145)
VANCOMYCIN SERPL-MCNC: 13.1 UG/ML (ref 10–20)
WBC # BLD AUTO: 25 10X3/UL (ref 4.8–10.8)

## 2019-08-15 NOTE — NUR
Calorie Count for 8/14:
kcalprotein
[id number]  32
[id number]  34
DINNER  not rcv'd    not rcv'd
 -----------      -----------
            1122 kcal       66 g protein
 
Total kcals/protein include 1.5 Ensure nutritional supplements; intake record
not received for 8/14 dinner. RD following.

## 2019-08-15 NOTE — NUR
REPORT RECEIVED FROM NIGHT SHIFT AND PATIENT CARE ASSUMED. PATIENT LAYING IN
BED ON BACK WITH EYES CLOSED AND BREATHING EVENLY. WILL CONTINUE WITH PLAN OF
CARE. SR UP X 2 BED IN LOW POSITION AND CALL LIGHT IN REACH.

## 2019-08-15 NOTE — MORECARE
CASE MANAGEMENT DISCHARGE SUMMARY
 
 
PATIENT: NEVILLE ESTEBAN                        UNIT: [id number]
ACCOUNT#: [id number]                       ADM DATE: 19
AGE: 79     : 40  SEX: M            ROOM/BED: D.2132    
AUTHOR: AMADOU,DOC                             PHYSICIAN:                               
 
REFERRING PHYSICIAN: GARRISON BECERRA MD               
DATE OF SERVICE: 08/15/19
Discharge Plan
 
 
Patient Name: NEVILLE ESTEBAN
Facility: [facility]:[place]
Encounter #: [id number]
Medical Record #: [id number]
: 1940
Planned Disposition: Skilled Nursing Facility
Anticipated Discharge Date: 19
 
Discharge Date: 
Expected LOS: 8
Initial Reviewer: [email or web address]
Initial Review Date: 2019
Generated: 8/15/19   9:04 am 
Comments
 
DCP- Discharge Planning
 
Updated by [email or web address]: Migel Vallecillo on 8/15/19   7:00 am CT
Patient Name:  NEVILLE ESTEBAN   
Encounter No:  [id number]   
:  1940   
Primary Insurance:  MEDICARE PART A ONLY  
Anticipated DC Date: 2019   
Planned Disposition:  Skilled Nursing Facility  
External Planned Provider: [facility]ARE REHAB 
BED  
  
  
DCP follow-up note:  FAXED HOSPITAL UPDATE TO [facility] AT 
[phone].   
  
FOR DISCHARGE, FAX DISCHARGE INFORMATION TO THE [facility], [phone], 
NURSE REPORT TO BE CALLED TO THE [facility] [phone]. [facility] TO 
ARRANGE VAN TRANSPORTATION.  
  
Migel Vallecillo CASE MANAGEMENT
DCP- Discharge Planning
 
 
Updated by [email or web address]: Migel Vallecillo on 19   8:57 am CT
Patient Name:  NEVILLE ESTEBAN   
Encounter No:  [id number]   
:  1940   
Primary Insurance:  MEDICARE PART A ONLY  
Anticipated DC Date: 2019   
Planned Disposition:  Skilled Nursing Facility  
External Planned Provider:[facility]ICARE REHAB 
BED  
  
  
DCP follow-up note:  FAXED HOSPITAL UPDATE TO [facility] AT 
[phone].   
  
FOR DISCHARGE, FAX DISCHARGE INFORMATION TO THE [facility], [phone], 
NURSE REPORT TO BE CALLED TO THE [facility] [phone]. [facility] TO 
ARRANGE VAN TRANSPORTATION.  
  
Migel Vallecillo CASE MANAGEMENT
DCP- Discharge Planning
 
Updated by [email or web address]: Migel Vallecillo on 19   8:44 am CT
Patient Name:  NEVILLE ESTEBAN   
Encounter No:  [id number]   
:  1940   
Primary Insurance:  MEDICARE PART A ONLY  
Anticipated DC Date: 2019   
Planned Disposition:  Skilled Nursing Facility  
External Planned Provider: [facility], MEDICARE REHAB 
BED  
  
  
DCP follow-up note: ON 19 CM SPOKE TO LASHAWN OF THE [facility], [phone], 
PT HAS BEEN ACCEPTED.  NEEL FLOWERS NOTIFIED, CM ADVISED PT HAD FEVER AND 
ELEVATED WBC'S, NOT READY TO DC 19.  CM NOTIFIED LASHAWN OF [facility].  
  
FOR DISCHARGE, FAX DISCHARGE INFORMATION TO THE [facility], [phone], 
NURSE REPORT TO BE CALLED TO THE [facility] [phone]. [facility] TO 
ARRANGE VAN TRANSPORTATION.  
  
Migel Vallecillo, CASE MANAGEMENT  
  
Appended by Migel Vallecillo on 2019 9:44 CDT:  
CM NOTIFIED PT OF THE [facility] ACCEPTANCE, PT IN AGREEMENT WITH DISCHARGE TO THE 
[facility]. IMPORTANT MESSAGE FROM MEDICARE PROVIDED AND EXPLAINED.  
  
FOR DISCHARGE, FAX DISCHARGE INFORMATION TO THE [facility], [phone], 
NURSE REPORT TO BE CALLED TO THE [facility] [phone]. [facility] TO 
ARRANGE VAN TRANSPORTATION.  
  
Migel Vallecillo CASE MANAGEMENT
 
DCP- Discharge Planning
 
Updated by [id number]: Migel Vallecillo on 19   8:10 am CT
Patient Name:  NEVILLE ESTEBAN   
Encounter No:  [id number]   
:  1940   
Primary Insurance:  MEDICARE PART A ONLY  
Anticipated DC Date: 2019   
Planned Disposition:  Skilled Nursing Facility  
External Planned Provider: [facility]  
  
  
DCP follow-up note: CM SPOKE TO LASHAWN, PT HAS BEEN ACCEPTED CLINICALLY FOR 
REHAB AT THE [facility] WITH TRANSITION TO LONG TERM CARE.  PT WILL HAVE TO 
PROVIDE FINANCIAL INFORMATION FOR MEDICAID APPLICATION AS PT IS NOT ELIGIBLE 
FOR [facility] NURSING HOME BENEFITS.  CM FAXED UPDATE TO LASHAWN FOR [facility] AT 
[phone]. CM SPOKE TO PT IN ROOM, INFORMED PT THAT WITHOUT PARTICIPATION 
IN THERAPY SERVICES, PLACEMENT MAY NOT BE POSSIBLE.  PT PROMISED TO 
PARTICIPATE IN HOSPITAL THERAPY SERVICES.  
  
CM WAITING PT'S PARTICIPATION WITH PHYSICAL THERAPY EVALUATION AND THERAPY 
SERVICES AS WELL AS FINANCIAL ARRANGEMENTS FOR PLACEMENT AT THE [facility].  
  
Migel Vallecillo., CASE MANAGEMENT
DCP- Discharge Planning
 
Updated by [email or web address]: Migel Vallecillo on 19   2:05 pm CT
Patient Name: NEVILLE ESTEBAN                                     
Admission Status: ER   
Accout number: [id number]                              
Admission Date: 2019   
: 1940                                                        
Admission Diagnosis:UNSPECIFIED ABDOMINAL PAIN   
Attending: GARRISON BECERRA                                                
Current LOS:  5   
  
Anticipated DC Date:    
Planned Disposition: Skilled Nursing Facility   
Primary Insurance: MEDICARE PART A ONLY   
PLANNED EXTERNAL PROVIDER :  THE [facility] NURSING [facility] RE[facility], MEDICARE REHAB BED 
  
Discharge Planning Comments:   
CM RECEIVED REQUEST FROM PT THROUGH BEDSIDE NURSE TO SPEAK TO PT REGARDING 
DISCHARGE PLANNING.  CM MET WITH PT IN ROOM TO DISCUSS DISCHARGE PLANNING AND 
NEEDS. PT REPORTS LIVING AT HOME INDEPENDENTLY AND ALONE.  PT [facility].  PT HAS HAD NO OUTSIDE SERVICES ASSISTING IN THE HOME. CM 
DISCUSSED AVAILABILITY OF HOME HEALTH, REHAB SERVICES AND MEDICAL EQUIPMENT. 
PT REPORTS HE IS NOT ABLE TO TAKE CARE OF HIMSELF ANY LONGER.  PT WANTS CM TO 
PLACE HIM SOMEWHERE FOR LONG TERM CARE.  PT REPORTS HE IS NOT ABLE TO GET OUT 
OF BED WITHOUT ASSISTANCE.  PT RECEIVES $1030 IN SOCIAL SECURITY AND $130 IN 
VETER[employer] DISABILITY MONTHLY.  PT WOULD ONLY GO TO A VA HOME AS A LAST RESORT 
AND IS ONLY 10% SERVICE CONNECTED WITH DISABILITY.  PT DENIES HAVING ANY 
 
OTHER ASSETS.  PT LIN WITH [employer] IN [place].  PT REPORTS BEING PAID UP 
FOR THE REST OF THE MONTH'S RENT AT THE [place] HOUSING AUTHORITY 
[facility], APARTMENT [address].  CM DISCUSSED SKILLED NURSING FACILITY FOR REHAB, PT 
REPORTS WILLINGNESS FOR REHAB BUT STATES HE NEEDS LONG TERM CARE TOO.  
LISTING OF NURSING HOMES PROVIDED, PT SIGNED CONSENT FOR NO SKILLED NURSING 
FACILITY PREFERENCE.  IMPORTANT MESSAGE FROM MEDICARE PROVIDED AND EXPLAINED. 
  
CM NOTIFIED LASHAWN OF [facility] OF REHAB AND LONG TERM CARE REFERRAL, 
[phone].  CM FAXED REFERRAL TO LASHAWN OF [facility] [phone].  
  
CM WAITING ADMISSION DETERMINATION FROM THE [facility] NURSING AND REHAB FOR REHAB 
AND LONG TERM CARE PLACEMENT.  
  
: Migel Vallecillo
 DCPIA - Discharge Planning Initial Assessment
 
Updated by [id number]: Migel Vallecillo on 19   2:58 pm
*  Is the patient Alert and Oriented?
Yes
*  How many steps to enter\exit or inside your home? ELEVATOR *  PCP DR. MEJIA - [facility]
*  Pharmacy
[facility] MAIL ORDER
 
*  Preadmission Environment
Home Alone
*  ADLs
Independent
*  Equipment
Walker
*  Other Equipment
[employer] MEDICAL EQUIPMENT PROVIDER
*  List name and contact numbers for known caregivers / representatives who 
currently or will assist patient after discharge:
RANULFO BLANCA, POLY, [phone]
*  Additional services required to return to the preadmission environment?
No
*  Can the patient safely return to the preadmission environment?
No
*  Has this patient been hospitalized within the prior 30 days at any 
hospital?
No
 
 
 
 
 
Coverage Notice
 
Reviewer: [email or web address] Vallecillo
 
Notice Issued Date-Time: 2019  13:30
 
Notice Type: IM Discharge Notice
 
Notice Delivered To: Patient
Relationship to Patient: 
Representative Name: 
 
Delivery Method: HAND - Hand Delivered
Emili Days:
Prior Verbal Notification: 
 
Recipient Understood Notice: Yes
Recipient Signature: Yes
Med Rec Note Co-signed by Attending:
 
Coverage Notice Comment:  
Reviewer: [email or web address] Vallecillo
 
Notice Issued Date-Time: 2019  13:30
Notice Type: Patient Choice Letter
 
Notice Delivered To: Patient
Relationship to Patient: 
Representative Name: 
 
Delivery Method: HAND - Hand Delivered
Emili Days:
Prior Verbal Notification: 
 
Recipient Understood Notice: Yes
Recipient Signature: Yes
Med Rec Note Co-signed by Attending:
 
Coverage Notice Comment:  NO SKILLED NURSING FACILITY PREFERENCE
Reviewer: [id number] Amanda Vallecillo
 
Notice Issued Date-Time: 2019   9:35
Notice Type: IM Discharge Notice
 
Notice Delivered To: Patient
Relationship to Patient: 
Representative Name: 
 
Delivery Method: HAND - Hand Delivered
Emili Days:
Prior Verbal Notification: 
 
Recipient Understood Notice: Yes
Recipient Signature: Yes
Med Rec Note Co-signed by Attending:
 
Coverage Notice Comment:  
 
 
Last DP export: 19   9:01 a
Patient Name: NEVILLE ESTEBAN
Encounter #: [id number]
Page [id number]
 
 
 
 
 
Electronically Signed by ANA TOBAR on 08/15/19 at 1007
 
 
 
 
 
 
**All edits/amendments must be made on the electronic document**
 
DICTATION DATE: 08/15/19 0804     : KERON  08/15/19 0804     
RPT#: [id number]                                DC DATE:        
                                               STATUS: ADM IN  
[facility]
 [place] [address]
***END OF REPORT***

## 2019-08-15 NOTE — NUR
PATIENT SITTING UP IN BED . PATIENT COMPLAINING THAT RAISIN BRAN NOT ON
BREAKFAST TRAY. SENT MESSAGE TO [facility] FOR SOME ASAP. PATIENT UP TO BR SCALE
FOR DAILY WT. WEIGHT .5. PATIENT REPOSITIONED FOR COMFORT. PATIENT
REFUSES MOST AL MEDS INCLUDING POTASSIUM. INFORMED PATIENT THAT POTASSIUM
LEVEL IS LOW. PATIENT STATES 'I AINT TAKING IT." PATIENT DENEIS ANY OTHER
NEEDS OR PAIN. WILL CONTINUE TO MONITOR. SR UP X 2 BED IN LOW POSITION AND
CALL LIGHT IN REACH.

## 2019-08-15 NOTE — NUR
PT RESTING IN BED ALERT AND ORIENTED X4. NO S/S OF DISTRESS AT THIS TIME. PT
BED LOW CALL LIGHT WITHIN REACH. WILL CONTINUE TO MONITOR.

## 2019-08-16 VITALS — DIASTOLIC BLOOD PRESSURE: 62 MMHG | SYSTOLIC BLOOD PRESSURE: 118 MMHG

## 2019-08-16 VITALS — SYSTOLIC BLOOD PRESSURE: 110 MMHG | DIASTOLIC BLOOD PRESSURE: 67 MMHG

## 2019-08-16 VITALS — SYSTOLIC BLOOD PRESSURE: 114 MMHG | DIASTOLIC BLOOD PRESSURE: 72 MMHG

## 2019-08-16 VITALS — SYSTOLIC BLOOD PRESSURE: 129 MMHG | DIASTOLIC BLOOD PRESSURE: 78 MMHG

## 2019-08-16 VITALS — DIASTOLIC BLOOD PRESSURE: 69 MMHG | SYSTOLIC BLOOD PRESSURE: 118 MMHG

## 2019-08-16 VITALS — SYSTOLIC BLOOD PRESSURE: 119 MMHG | DIASTOLIC BLOOD PRESSURE: 74 MMHG

## 2019-08-16 VITALS — SYSTOLIC BLOOD PRESSURE: 123 MMHG | DIASTOLIC BLOOD PRESSURE: 76 MMHG

## 2019-08-16 LAB
ANION GAP SERPL CALC-SCNC: 3.2 MMOL/L (ref 8–16)
BASOPHILS NFR BLD AUTO: 0.2 % (ref 0–2)
BUN SERPL-MCNC: 21 MG/DL (ref 7–18)
CALCIUM SERPL-MCNC: 7.8 MG/DL (ref 8.5–10.1)
CHLORIDE SERPL-SCNC: 107 MMOL/L (ref 98–107)
CO2 SERPL-SCNC: 37.7 MMOL/L (ref 21–32)
CREAT SERPL-MCNC: 1 MG/DL (ref 0.6–1.3)
EOSINOPHIL NFR BLD: 6.5 % (ref 0–7)
ERYTHROCYTE [DISTWIDTH] IN BLOOD BY AUTOMATED COUNT: 16.5 % (ref 11.5–14.5)
GLUCOSE SERPL-MCNC: 138 MG/DL (ref 74–106)
HCT VFR BLD CALC: 44 % (ref 42–54)
HGB BLD-MCNC: 13.5 G/DL (ref 13.5–17.5)
IMM GRANULOCYTES NFR BLD: 0.8 % (ref 0–5)
LYMPHOCYTES NFR BLD AUTO: 3.7 % (ref 15–50)
MCH RBC QN AUTO: 26.8 PG (ref 26–34)
MCHC RBC AUTO-ENTMCNC: 30.7 G/DL (ref 31–37)
MCV RBC: 87.5 FL (ref 80–100)
MONOCYTES NFR BLD: 11.9 % (ref 2–11)
NEUTROPHILS NFR BLD AUTO: 76.9 % (ref 40–80)
OSMOLALITY SERPL CALC.SUM OF ELEC: 293 MOSM/KG (ref 275–300)
PLATELET # BLD: 298 10X3/UL (ref 130–400)
PMV BLD AUTO: 12.2 FL (ref 7.4–10.4)
POTASSIUM SERPL-SCNC: 2.9 MMOL/L (ref 3.5–5.1)
RBC # BLD AUTO: 5.03 10X6/UL (ref 4.2–6.1)
SODIUM SERPL-SCNC: 145 MMOL/L (ref 136–145)
WBC # BLD AUTO: 24.8 10X3/UL (ref 4.8–10.8)

## 2019-08-16 NOTE — NUR
PT RESTING IN BED WITH EYES CLOSED RR EVEN AND UNLABORED. NO S/S OF DISTRESS
AT THIS TIME. WILL CONTINUE TO MONITOR.

## 2019-08-16 NOTE — NUR
PATIENT LAYING IN BED ON BACK . REPOSITIONED PATIENT TO LEFT SIDE. PATIENT
DENIES ANY NEEDS OR PAIN. WILL CONTINUE WITH PLAN OF CARE. SR UP X 2 BED IN
LOW POSTION AND CALL LIGHT IN REACH.

## 2019-08-16 NOTE — NUR
REPORT RECEIVED FROM NIGHT SHIFT AND PATIENT CARE ASSUMED. PATIENT LAYING IN
BED ON BACK AWAKE, ALERT AND ORIENTED  X 4. PATIENT IS STABLE AND VSS.
REPOSITIOPNED PATIENT TO RT SIDE. WILL CONTINUE WITH PLAN OF CARE. SR UP X 2
BED IN LOW POSITION AND CALL CLIGHT IN REACH.

## 2019-08-16 NOTE — NUR
PATIENT LAYING IN BED ON LT SIDE. PATIENT IS AWAKE. PATIENT DENIES ANY NEEDS
OR PAIN. REPOSITIONED FOR COMFORT. WILL CONTINUE TO MONITOR. SR UP X 2 BED IN
LOW POSITION AND CALL LIGHT IN REACH.

## 2019-08-16 NOTE — NUR
EVENING ROUNDS COMPLETED. REPORT RECEIVED. PT SITTING UP IN BED WITH EYES
OPEN, RR EVEN AND UNLABORED. NO S/S OF DISTRESS NOTED. INTRODUCED SELF TO PT.
CLEANED UP PT FROM EPISODE OF INCONTINENCE. PT DENIES FURTHER NEEDS AT THIS
TIME. CALL LIGHT IN REACH. WILL CTM.

## 2019-08-17 VITALS — SYSTOLIC BLOOD PRESSURE: 112 MMHG | DIASTOLIC BLOOD PRESSURE: 75 MMHG

## 2019-08-17 VITALS — DIASTOLIC BLOOD PRESSURE: 82 MMHG | SYSTOLIC BLOOD PRESSURE: 132 MMHG

## 2019-08-17 VITALS — DIASTOLIC BLOOD PRESSURE: 66 MMHG | SYSTOLIC BLOOD PRESSURE: 119 MMHG

## 2019-08-17 VITALS — DIASTOLIC BLOOD PRESSURE: 82 MMHG | SYSTOLIC BLOOD PRESSURE: 121 MMHG

## 2019-08-17 VITALS — SYSTOLIC BLOOD PRESSURE: 116 MMHG | DIASTOLIC BLOOD PRESSURE: 71 MMHG

## 2019-08-17 LAB
ANION GAP SERPL CALC-SCNC: 5.5 MMOL/L (ref 8–16)
BUN SERPL-MCNC: 20 MG/DL (ref 7–18)
CALCIUM SERPL-MCNC: 8.1 MG/DL (ref 8.5–10.1)
CHLORIDE SERPL-SCNC: 106 MMOL/L (ref 98–107)
CO2 SERPL-SCNC: 35.9 MMOL/L (ref 21–32)
CREAT SERPL-MCNC: 0.7 MG/DL (ref 0.6–1.3)
EOSINOPHIL NFR BLD: 7 % (ref 0–7)
ERYTHROCYTE [DISTWIDTH] IN BLOOD BY AUTOMATED COUNT: 16.6 % (ref 11.5–14.5)
GLUCOSE SERPL-MCNC: 78 MG/DL (ref 74–106)
HCT VFR BLD CALC: 45 % (ref 42–54)
HGB BLD-MCNC: 13.5 G/DL (ref 13.5–17.5)
LYMPHOCYTES NFR BLD AUTO: 12 % (ref 15–50)
MCH RBC QN AUTO: 26.3 PG (ref 26–34)
MCHC RBC AUTO-ENTMCNC: 30 G/DL (ref 31–37)
MCV RBC: 87.5 FL (ref 80–100)
MONOCYTES NFR BLD: 1 % (ref 2–11)
NEUTROPHILS NFR BLD AUTO: 80 % (ref 40–80)
OSMOLALITY SERPL CALC.SUM OF ELEC: 288 MOSM/KG (ref 275–300)
PLATELET # BLD EST: NORMAL 10*3/UL
PLATELET # BLD: 288 10X3/UL (ref 130–400)
PMV BLD AUTO: 12.4 FL (ref 7.4–10.4)
POTASSIUM SERPL-SCNC: 3.4 MMOL/L (ref 3.5–5.1)
RBC # BLD AUTO: 5.14 10X6/UL (ref 4.2–6.1)
SODIUM SERPL-SCNC: 144 MMOL/L (ref 136–145)
WBC # BLD AUTO: 21.2 10X3/UL (ref 4.8–10.8)

## 2019-08-17 NOTE — NUR
AID GOT PT OFF THE BEDPAN, CALLED ME INTO LOOK. PT HAD A SOLID RED BLOOD CLOT
IN HIS STOOL WITH SOME OTHER BLEEDING AROUND THE DIAHREAH. THE ODER WAS VERY
STRONG, REMINDING ME OF A GI BLEED. CALLED NEEL BURTON

## 2019-08-17 NOTE — NUR
INITIAL ROUNDS COMPLETED AT 1910 HRS.  PT REPOSITIONED IN BED FOR COMFORT AT
THAT TIME. PT PLACED ON BEDPAN AT 2000 HRS.  LARGE AMOUNT OF DIARRHEA NOTED
WITH SMALL BLOOD CLOTS.  PT CLEANEDAND REPOSITIONED IN BED.  ASSESSMENT
COMPLETED AT 2000 HRS.  VSS.  ALERT AND ORIENTED.  BEST.  O2 3LNC. LUNGS
DIMINISHED IN BASES BILAT.  HEART TONES S1 S2.  ABD SOFT WITH ACTIVE BS NOTED.
 BRUISING NOTED TO COCCYX.IV TO UPPER L ARM SL.  HEELS BRIDGED BUT NO
BREAKDOWN NOTED.  PT REFUSED TO HAVE EGGCRATE MATTRESS PLACED BACK ON BED.  PM
MEDS GIVEN.  PT REFUSED SINGULAIR AND NASAL SPRAYS.  AT 2230 HRS PT STASED HIS
L HIP HURT.  REFUSED OFFER FOR TYLENOL.  PT REPOSITIONED IN BED AND PILLOW
PLACED UNDER L HIP. SR UP X2, CALL LIGHT WITHIN REACH AND BED ALARM ON.

## 2019-08-17 NOTE — NUR
PT AWAKE WHEN I ENTERED, SEEMS AS THOUGH HE JUST WOKE UP. NO COMPLAINTS,
CONCERNS, QUESTIONS THIS MORNING. CL IN REACH, SRX2.

## 2019-08-17 NOTE — NUR
PT HAS PUSHED CALL LIGHT ONCE ABOUT EVERY 10 MINUTES FOR AN HOUR. PT STATES
HE'S UNCOMFORTBALE. NEEDS TO BE PULLED UP, WE PULL HIM UP AND HE SAYS IT'S NOT
ENOUGH. PT DEMANDED WE TAKE THE EGG CRATE MATTRESS, OBLIGED. PT SEESM TO BE
UNABLE TO SATISFY.

## 2019-08-18 VITALS — DIASTOLIC BLOOD PRESSURE: 73 MMHG | SYSTOLIC BLOOD PRESSURE: 109 MMHG

## 2019-08-18 VITALS — SYSTOLIC BLOOD PRESSURE: 136 MMHG | DIASTOLIC BLOOD PRESSURE: 82 MMHG

## 2019-08-18 VITALS — DIASTOLIC BLOOD PRESSURE: 71 MMHG | SYSTOLIC BLOOD PRESSURE: 103 MMHG

## 2019-08-18 VITALS — DIASTOLIC BLOOD PRESSURE: 65 MMHG | SYSTOLIC BLOOD PRESSURE: 113 MMHG

## 2019-08-18 VITALS — DIASTOLIC BLOOD PRESSURE: 64 MMHG | SYSTOLIC BLOOD PRESSURE: 106 MMHG

## 2019-08-18 LAB
ANION GAP SERPL CALC-SCNC: 7.8 MMOL/L (ref 8–16)
BASOPHILS NFR BLD AUTO: 0.1 % (ref 0–2)
BUN SERPL-MCNC: 19 MG/DL (ref 7–18)
CALCIUM SERPL-MCNC: 8.1 MG/DL (ref 8.5–10.1)
CHLORIDE SERPL-SCNC: 104 MMOL/L (ref 98–107)
CO2 SERPL-SCNC: 34.2 MMOL/L (ref 21–32)
CREAT SERPL-MCNC: 0.8 MG/DL (ref 0.6–1.3)
EOSINOPHIL NFR BLD: 7.1 % (ref 0–7)
ERYTHROCYTE [DISTWIDTH] IN BLOOD BY AUTOMATED COUNT: 16.8 % (ref 11.5–14.5)
GLUCOSE SERPL-MCNC: 74 MG/DL (ref 74–106)
HCT VFR BLD CALC: 45.4 % (ref 42–54)
HCT VFR BLD CALC: 46.1 % (ref 42–54)
HCT VFR BLD CALC: 46.6 % (ref 42–54)
HGB BLD-MCNC: 13.6 G/DL (ref 13.5–17.5)
HGB BLD-MCNC: 14 G/DL (ref 13.5–17.5)
HGB BLD-MCNC: 14.1 G/DL (ref 13.5–17.5)
IMM GRANULOCYTES NFR BLD: 0.7 % (ref 0–5)
LYMPHOCYTES NFR BLD AUTO: 2.7 % (ref 15–50)
MCH RBC QN AUTO: 26.3 PG (ref 26–34)
MCHC RBC AUTO-ENTMCNC: 30 G/DL (ref 31–37)
MCV RBC: 87.8 FL (ref 80–100)
MONOCYTES NFR BLD: 12.5 % (ref 2–11)
NEUTROPHILS NFR BLD AUTO: 76.9 % (ref 40–80)
OSMOLALITY SERPL CALC.SUM OF ELEC: 283 MOSM/KG (ref 275–300)
PLATELET # BLD: 268 10X3/UL (ref 130–400)
PMV BLD AUTO: 12.6 FL (ref 7.4–10.4)
POTASSIUM SERPL-SCNC: 4 MMOL/L (ref 3.5–5.1)
RBC # BLD AUTO: 5.17 10X6/UL (ref 4.2–6.1)
SODIUM SERPL-SCNC: 142 MMOL/L (ref 136–145)
WBC # BLD AUTO: 21.5 10X3/UL (ref 4.8–10.8)

## 2019-08-18 NOTE — NUR
PT DAUGHTER GONE FOR THE NIGHT, PT AGREABLE AT THIS TIME TO TAKE ALL
MEDICATIONS. PROTOINIX DRIP RUNNING. NO COMPLAINTS OR CONCERNS VOICED AT THIS
TIME. ALL QUESTIONS ANSWERED TO THE BEST OF MY ABILITY. CL IN REACH, SRX2.

## 2019-08-18 NOTE — NUR
PT AWAKE AND ORIENTED WHEN I ENTERED THE ROOM. PT HAD HAD A B.M IN THE BED.
LARGE, RUNNY AND BLOODY. PT ROOM/FECES SMELL OF GI BLEED. LOVENOX IS ON HOLD.
PT HAS SOME ANXIETY ABOUT THE BLOODY STOOLS. REARANGED PT, LINNENS CHANGED, PT
CLEAN AND DRY. NO COMPLAINTS, CONCERNS VOICED THIS AM. ALL QUESTIONS ANSWERED
TO THE BEST OF MY ABILITY. CL IN REACH, SRX2.

## 2019-08-18 NOTE — NUR
INITIAL ROUNDS COMPLETED AT 1920 HRS.  PT RESTING WITH EYES CLOSED.  RESP EVEN
AND REGULAR.  ASSESSMENT COMPLETED AT 2015 HRS.  VSS  ALERT AND ORIENTED TO
PERSON, PLACE AND TIME.  PT VERY FRAIL LOOKING.  O2 3LNC.  LUNGS DIMINISHED IN
BASES BILAT.  BEST.  IV TO UPPER L ARM WITH PROTONIX DRIP AT 8MG/HR (10CC/HR).
IV PATENT.  BRUISES NOTED TO BILAT ARMS.  REDNESS NOTED TO COCCYX.  ABD SOFT
WITH ACTIVE BS NOTED.  PM MELATONIN AND FLOMAX GIVEN.  PT REFUSED ALL OTHER
MEDS EXCEPT CALOSEPTINE TO COCCYX.  REPOSITONED IN BED FOR COMFORT.  PT
CURRENTLY RESTING WITH CLOSED.  RESP EVEN AND REGULAR.  SR UP X2, CALL LIGHT
WITHIN REACH.

## 2019-08-18 NOTE — NUR
PT RESTED WELL DURING NIGHT.  AM K+ 4.0.  NO COVERAGE NECESSARY.  NEEDS MET;
WILL CONTINUE TO MONITOR.

## 2019-08-18 NOTE — MORECARE
CASE MANAGEMENT DISCHARGE SUMMARY
 
 
PATIENT: NEVILLE ESTEBAN                        UNIT: [id number]
ACCOUNT#: [id number]                       ADM DATE: 19
AGE: 79     : 40  SEX: M            ROOM/BED: D.2132    
AUTHOR: AMADOU,DOC                             PHYSICIAN:                               
 
REFERRING PHYSICIAN: GARRISON ARMENTA MD               
DATE OF SERVICE: 19
Discharge Plan
 
 
Patient Name: NEVILLE ESTEBAN
Facility: [facility]
Encounter #: [id number]
Medical Record #: [id number]
: 1940
Planned Disposition: Skilled Nursing Facility
Anticipated Discharge Date: 19
 
Discharge Date: 
Expected LOS: 8
Initial Reviewer: [id number]
Initial Review Date: 2019
Generated: 19   4:40 pm 
Comments
 
DCP- Discharge Planning
 
Updated by [id number]: Bailey Bello on 19   2:37 pm CT
Order received for patient to dc to nursing facility. Nursing requesting that 
CM speak to patient to see if the nursing home could accept  today.  Reviewed 
chart, noted the [facility] has accepted back on the .  Spoke to Lashawn with 
the [facility] to see if the patient could be admitted today.  Lashawn said patient 
could come today and they would pick him up at 1400.  Cm called Dr. Armenta, 
informed him the patient could dc today. Dr. Armenta informed cm that the 
patient was not medically stable for dc today because he has had 3 bloody 
diarrhea stools and that he has consulted GI.  CM therefore informed him once 
the patient is medically stable that the patient had acceptance and no 
further consult was needed just a dc order.  CM faxed update to Lashawn at her 
request. Cm also notified Lashawn that the patient was not medically stable 
for dc at this time.   
  
Bailey Bello RN, [facility]
DCP- Discharge Planning
 
Updated by [email or web address]: Migel Vallecillo on 8/15/19   7:00 am CT
Patient Name:  NEVILLE ESTEBAN   
Encounter No:  [id number]   
 
:  1940   
Primary Insurance:  MEDICARE PART A ONLY  
Anticipated DC Date: 2019   
Planned Disposition:  Skilled Nursing Facility  
External Planned Provider: [facility] 
[facility]ED  
  
  
DCP follow-up note: CM FAXED HOSPITAL UPDATE TO LASHAWN OF THE [facility] AT 
[phone].   
  
FOR DISCHARGE, FAX DISCHARGE INFORMATION TO THE [facility], [phone], 
NURSE REPORT TO BE CALLED TO THE [facility] [phone]. [facility] TO 
ARRANGE VAN TRANSPORTATION.  
  
Migel Vallecillo CASE MANAGEMENT
DCP- Discharge Planning
 
Updated by [id number]: Migel Vallecillo on 19   8:57 am CT
Patient Name:  NEVILLE ESTEBAN   
Encounter No:  [id number]   
:  1940   
Primary Insurance:  MEDICARE PART A ONLY  
Anticipated DC Date: 2019   
Planned Disposition:  Skilled Nursing Facility  
External Planned Provider:[facility] 
BED  
  
  
DCP follow-up note: CM FAXED HOSPITAL UPDATE TO [facility] AT 
[phone].   
  
FOR DISCHARGE, FAX DISCHARGE INFORMATION TO THE [facility], [phone], 
NURSE REPORT TO BE CALLED TO THE [facility] [phone]. [facility] TO 
ARRANGE VAN TRANSPORTATION.  
  
VANCE Yousif
DCP- Discharge Planning
 
Updated by [id number]: Migel Vallecillo on 19   8:44 am CT
Patient Name:  NEVILLE ESTEBAN   
Encounter No:  [id number]   
:  1940   
Primary Insurance:  MEDICARE PART A ONLY  
Anticipated DC Date: 2019   
Planned Disposition:  Skilled Nursing Facility  
External Planned Provider: [facility] 
BED  
  
  
DCP follow-up note: ON 19 CM SPOKE TO LASHAWN OF THE [facility], [phone], 
PT HAS BEEN ACCEPTED.  NEEL FLOWERS NOTIFIED, CM ADVISED PT HAD FEVER AND 
 
ELEVATED WBC'S, NOT READY TO DC 19.  CM NOTIFIED LASHAWN OF THE [facility].  
  
FOR DISCHARGE, FAX DISCHARGE INFORMATION TO THE [facility], [phone], 
NURSE REPORT TO BE CALLED TO THE [facility] [phone]. [facility] TO 
ARRANGE VAN TRANSPORTATION.  
  
Migel Vallecillo, CASE MANAGEMENT  
  
Appended by Migel Vallecillo on 2019 9:44 CDT:  
CM NOTIFIED PT OF THE [facility] ACCEPTANCE, PT IN AGREEMENT WITH DISCHARGE TO THE 
[facility]. IMPORTANT MESSAGE FROM MEDICARE PROVIDED AND EXPLAINED.  
  
FOR DISCHARGE, FAX DISCHARGE INFORMATION TO THE [facility], [phone], 
NURSE REPORT TO BE CALLED TO THE [facility] [phone]. [facility] TO 
ARRANGE VAN TRANSPORTATION.  
  
Migel Vallecillo, CASE MANAGEMENT
DCP- Discharge Planning
 
Updated by [id number]: Migel Vallecillo on 19   8:10 am CT
Patient Name:  NEVILLE ESTEBAN   
Encounter No:  [id number]   
:  1940   
Primary Insurance:  MEDICARE PART A ONLY  
Anticipated DC Date: 2019   
Planned Disposition:  Skilled Nursing Facility  
External Planned Provider: THE [facility] NURSING AND REHAB, MEDICARE REHAB BED  
  
  
DCP follow-up note: CM SPOKE TO LASHAWN, PT HAS BEEN ACCEPTED CLINICALLY FOR 
REHAB AT THE [facility] WITH TRANSITION TO LONG TERM CARE.  PT WILL HAVE TO 
PROVIDE FINANCIAL INFORMATION FOR MEDICAID APPLICATION AS PT IS NOT ELIGIBLE 
FOR [facility] BENEFITS.  CM FAXED UPDATE TO [place] FOR THE [facility] AT 
[phone]. CM SPOKE TO PT IN ROOM, INFORMED PT THAT WITHOUT PARTICIPATION 
IN THERAPY SERVICES, PLACEMENT MAY NOT BE POSSIBLE.  PT PROMISED TO 
PARTICIPATE IN HOSPITAL THERAPY SERVICES.  
  
CM WAITING PT'S PARTICIPATION WITH PHYSICAL THERAPY EVALUATION AND THERAPY 
SERVICES AS WELL AS FINANCIAL ARRANGEMENTS FOR PLACEMENT AT THE [facility].  
  
Migel Vallecillo., CASE MANAGEMENT
DCP- Discharge Planning
 
Updated by [id number]: Migel Vallecillo on 19   2:05 pm CT
Patient Name: NEVILLE ESTEBAN                                     
Admission Status: ER   
Accout number: [id number]                              
Admission Date: 2019   
: 1940                                                        
Admission Diagnosis:UNSPECIFIED ABDOMINAL PAIN   
Attending: GARRISON ARMENTA                                                
Current LOS:  5   
 
  
Anticipated DC Date:    
Planned Disposition: Skilled Nursing Facility   
Primary Insurance: MEDICARE PART A ONLY   
PLANNED EXTERNAL PROVIDER :  THE [facility] NURSING AND REHAB, MEDICARE REHAB BED 
  
Discharge Planning Comments:   
CM RECEIVED REQUEST FROM PT THROUGH BEDSIDE NURSE TO SPEAK TO PT REGARDING 
DISCHARGE PLANNING.  CM MET WITH PT IN ROOM TO DISCUSS DISCHARGE PLANNING AND 
NEEDS. PT REPORTS LIVING AT HOME INDEPENDENTLY AND ALONE.  [facility]  PT HAS HAD NO OUTSIDE SERVICES ASSISTING IN THE HOME. CM 
DISCUSSED AVAILABILITY OF HOME HEALTH, REHAB SERVICES AND MEDICAL EQUIPMENT. 
PT REPORTS HE IS NOT ABLE TO TAKE CARE OF HIMSELF ANY LONGER.  PT WANTS CM TO 
PLACE HIM SOMEWHERE FOR LONG TERM CARE.  PT REPORTS HE IS NOT ABLE TO GET OUT 
OF BED WITHOUT ASSISTANCE.  PT RECEIVES $1030 IN SOCIAL SECURITY AND $130 IN 
VETERANS DISABILITY MONTHLY.  PT WOULD ONLY GO TO A VA HOME AS A LAST RESORT 
AND IS ONLY 10% SERVICE CONNECTED WITH DISABILITY.  PT DENIES HAVING ANY 
OTHER ASSETS.  PT LIN WITH [employer] IN [place].  PT REPORTS BEING PAID UP 
FOR THE REST OF THE MONTH'S RENT AT THE [place] [facility]OUS[facility] AUTHOR[facility] 
[facility], [address]ART[address].  CM DISCUSSED SKILLED NURSING FACILITY FOR REHAB, PT 
REPORTS WILLINGNESS FOR REHAB BUT STATES HE NEEDS LONG TERM CARE TOO.  
LISTING OF NURSING HOMES PROVIDED, PT SIGNED CONSENT FOR NO SKILLED NURSING 
FACILITY PREFERENCE.  IMPORTANT MESSAGE FROM MEDICARE PROVIDED AND EXPLAINED. 
  
CM NOTIFIED LASHAWN OF [facility] OF REHAB AND LONG TERM CARE REFERRAL, 
[phone].  CM FAXED REFERRAL TO LASHAWN OF THE [facility] [phone].  
  
CM WAITING ADMISSION DETERMINATION FROM THE [facility] NURSING AND REHAB FOR REHAB 
AND LONG TERM CARE PLACEMENT.  
  
: Migel Vallecillo
 DCPIA - Discharge Planning Initial Assessment
 
Updated by [id number]: Migel Vallecillo on 19   2:58 pm
*  Is the patient Alert and Oriented?
Yes
*  How many steps to enter\exit or inside your home? ELEVATOR *  PCP DR. MEJIA - [facility]
*  Pharmacy
[facility] MAIL ORDER
 
*  Preadmission Environment
Home Alone
*  ADLs
Independent
*  Equipment
Walker
*  Other Equipment
[employer] MEDICAL EQUIPMENT PROVIDER
*  List name and contact numbers for known caregivers / representatives who 
currently or will assist patient after discharge:
RANULFO BLANCA, FRIEND, [phone]
 
*  Additional services required to return to the preadmission environment?
No
*  Can the patient safely return to the preadmission environment?
No
*  Has this patient been hospitalized within the prior 30 days at any 
hospital?
No
 
 
 
 
 
Coverage Notice
 
Reviewer: [email or web address] Vallecillo
 
Notice Issued Date-Time: 2019  13:30
Notice Type: IM Discharge Notice
 
Notice Delivered To: Patient
Relationship to Patient: 
Representative Name: 
 
Delivery Method: HAND - Hand Delivered
Emili Days:
Prior Verbal Notification: 
 
Recipient Understood Notice: Yes
Recipient Signature: Yes
Med Rec Note Co-signed by Attending:
 
Coverage Notice Comment:  
Reviewer: MAVERICK Vallecillo
 
Notice Issued Date-Time: 2019  13:30
Notice Type: Patient Choice Letter
 
Notice Delivered To: Patient
Relationship to Patient: 
Representative Name: 
 
Delivery Method: HAND - Hand Delivered
Emili Days:
Prior Verbal Notification: 
 
Recipient Understood Notice: Yes
Recipient Signature: Yes
Med Rec Note Co-signed by Attending:
 
Coverage Notice Comment:  NO SKILLED NURSING FACILITY PREFERENCE
Reviewer: [email or web address] Amanda Vallecillo
 
 
Notice Issued Date-Time: 2019   9:35
Notice Type: IM Discharge Notice
 
Notice Delivered To: Patient
Relationship to Patient: 
Representative Name: 
 
Delivery Method: HAND - Hand Delivered
Emili Days:
Prior Verbal Notification: 
 
Recipient Understood Notice: Yes
Recipient Signature: Yes
Med Rec Note Co-signed by Attending:
 
Coverage Notice Comment:  
Reviewer: [id number] Amanda Bello
 
Notice Issued Date-Time: 2019  13:26
Notice Type: IM Discharge Notice
 
Notice Delivered To: Patient
Relationship to Patient: 
Representative Name: 
 
Delivery Method: HAND - Hand Delivered
Emili Days:
Prior Verbal Notification: 
 
Recipient Understood Notice: 
Recipient Signature: 
Med Rec Note Co-signed by Attending:
 
Coverage Notice Comment:  
 
Last DP export: 19  12:29 p
Patient Name: NEVILLE ESTEBAN
 
Encounter #: [id number]
Page [id number]
 
 
 
 
 
Electronically Signed by ANA TOBAR on 19 at 1540
 
 
 
 
 
 
**All edits/amendments must be made on the electronic document**
 
DICTATION DATE: 19 154     : KERON  19 1540     
RPT#: [id number]                                DC DATE:        
                                               STATUS: ADM IN  
[facility]
[address] [place] [address]
***END OF REPORT***

## 2019-08-18 NOTE — MORECARE
CASE MANAGEMENT DISCHARGE SUMMARY
 
 
PATIENT: NEVILLE ESTEBAN                        UNIT: [id number]
ACCOUNT#: [id number]                       ADM DATE: 19
AGE: 79     : 40  SEX: M            ROOM/BED: D.2132    
AUTHOR: AMADOU,DOC                             PHYSICIAN:                               
 
REFERRING PHYSICIAN: GARRISON BECERRA MD               
DATE OF SERVICE: 19
Discharge Plan
 
 
Patient Name: NEVILLE ESTEBAN
Facility: [facility]:[place]
Encounter #: [id number]
Medical Record #: [id number]
: 1940
Planned Disposition: Skilled Nursing Facility
Anticipated Discharge Date: 19
 
Discharge Date: 
Expected LOS: 8
Initial Reviewer: [id number]
Initial Review Date: 2019
Generated: 19   2:28 pm 
Comments
 
DCP- Discharge Planning
 
Updated by [email or web address]: Migel Vallecillo on 8/15/19   7:00 am CT
Patient Name:  NEVILLE ESTEBAN   
Encounter No:  [id number]   
:  1940   
Primary Insurance:  MEDICARE PART A ONLY  
Anticipated DC Date: 2019   
Planned Disposition:  Skilled Nursing Facility  
External Planned Provider: [facility] 
BED  
  
  
DCP follow-up note:  FAXED HOSPITAL UPDATE TO [facility] AT 
[phone].   
  
FOR DISCHARGE, FAX DISCHARGE INFORMATION TO THE [facility], [phone], 
NURSE REPORT TO BE CALLED TO THE [facility] [phone]. [facility] TO 
ARRANGE VAN TRANSPORTATION.  
  
Migel Vallecillo CASE MANAGEMENT
DCP- Discharge Planning
 
 
Updated by [id number]: Migel Vallecillo on 19   8:57 am CT
Patient Name:  NEVILLE ESTEBAN   
Encounter No:  [id number]   
:  1940   
Primary Insurance:  MEDICARE PART A ONLY  
Anticipated DC Date: 2019   
Planned Disposition:  Skilled Nursing Facility  
External Planned Provider:[facility] 
BED  
  
  
DCP follow-up note:  FAXED HOSPITAL UPDATE TO [facility] AT 
[phone].   
  
FOR DISCHARGE, FAX DISCHARGE INFORMATION TO THE [facility], [phone], 
NURSE REPORT TO BE CALLED TO THE [facility] [phone]. [facility] TO 
ARRANGE VAN TRANSPORTATION.  
  
Migel Vallecillo CASE MANAGEMENT
DCP- Discharge Planning
 
Updated by [id number]: Migel Vallecillo on 19   8:44 am CT
Patient Name:  NEVILLE ESTEBAN   
Encounter No:  [id number]   
:  1940   
Primary Insurance:  MEDICARE PART A ONLY  
Anticipated DC Date: 2019   
Planned Disposition:  Skilled Nursing Facility  
External Planned Provider: [facility] 
BED  
  
  
DCP follow-up note: ON 19 CM SPOKE TO LASHAWN OF THE [facility], [phone], 
PT HAS BEEN ACCEPTED.  NEEL FLOWERS NOTIFIED, CM ADVISED PT HAD FEVER AND 
ELEVATED WBC'S, NOT READY TO DC 19.  CM NOTIFIED LASHAWN OF [facility].  
  
FOR DISCHARGE, FAX DISCHARGE INFORMATION TO THE [facility], [phone], 
NURSE REPORT TO BE CALLED TO THE [facility] [phone]. [facility] TO 
ARRANGE VAN TRANSPORTATION.  
  
Migel Vallecillo, CASE MANAGEMENT  
  
Appended by Migel Vallecillo on 2019 9:44 CDT:  
CM NOTIFIED PT OF THE [facility] ACCEPTANCE, PT IN AGREEMENT WITH DISCHARGE TO THE 
[facility]. IMPORTANT MESSAGE FROM MEDICARE PROVIDED AND EXPLAINED.  
  
FOR DISCHARGE, FAX DISCHARGE INFORMATION TO THE [facility], [phone], 
NURSE REPORT TO BE CALLED TO THE [facility] [phone]. [facility] TO 
ARRANGE VAN TRANSPORTATION.  
  
Migel Vallecillo CASE MANAGEMENT
 
DCP- Discharge Planning
 
Updated by [id number]: Migel Vallecillo on 19   8:10 am CT
Patient Name:  NEVILLE ESTEBAN   
Encounter No:  [id number]   
:  1940   
Primary Insurance:  MEDICARE PART A ONLY  
Anticipated DC Date: 2019   
Planned Disposition:  Skilled Nursing Facility  
External Planned Provider: [facility]  
  
  
DCP follow-up note: CM SPOKE TO LASHAWN, PT HAS BEEN ACCEPTED CLINICALLY FOR 
REHAB AT THE [facility] WITH TRANSITION TO LONG TERM CARE.  PT WILL HAVE TO 
PROVIDE FINANCIAL INFORMATION FOR MEDICAID APPLICATION AS PT IS NOT ELIGIBLE 
FOR [facility] NURSING HOME BENEFITS.  CM FAXED UPDATE TO LASHAWN FOR [facility] AT 
[phone]. CM SPOKE TO PT IN ROOM, INFORMED PT THAT WITHOUT PARTICIPATION 
IN THERAPY SERVICES, PLACEMENT MAY NOT BE POSSIBLE.  PT PROMISED TO 
PARTICIPATE IN HOSPITAL THERAPY SERVICES.  
  
CM WAITING PT'S PARTICIPATION WITH PHYSICAL THERAPY EVALUATION AND THERAPY 
SERVICES AS WELL AS FINANCIAL ARRANGEMENTS FOR PLACEMENT AT THE [facility].  
  
Migel Vallecillo., CASE MANAGEMENT
DCP- Discharge Planning
 
Updated by [id number]: Migel Vallecillo on 19   2:05 pm CT
Patient Name: NEVILLE ESTEBAN                                     
Admission Status: ER   
Accout number: [id number]                              
Admission Date: 2019   
: 1940                                                        
Admission Diagnosis:UNSPECIFIED ABDOMINAL PAIN   
Attending: GARRISON BECERRA                                                
Current LOS:  5   
  
Anticipated DC Date:    
Planned Disposition: Skilled Nursing Facility   
Primary Insurance: MEDICARE PART A ONLY   
PLANNED EXTERNAL PROVIDER :  THE [facility] NURSING AND REHAB, MEDICARE REHAB BED 
  
Discharge Planning Comments:   
CM RECEIVED REQUEST FROM PT THROUGH BEDSIDE NURSE TO SPEAK TO PT REGARDING 
DISCHARGE PLANNING.  CM MET WITH PT IN ROOM TO DISCUSS DISCHARGE PLANNING AND 
NEEDS. PT REPORTS LIVING AT HOME INDEPENDENTLY AND ALONE.  PT [facility].  PT HAS HAD NO OUTSIDE SERVICES ASSISTING IN THE HOME. CM 
DISCUSSED AVAILABILITY OF HOME HEALTH, REHAB SERVICES AND MEDICAL EQUIPMENT. 
PT REPORTS HE IS NOT ABLE TO TAKE CARE OF HIMSELF ANY LONGER.  PT WANTS CM TO 
PLACE HIM SOMEWHERE FOR LONG TERM CARE.  PT REPORTS HE IS NOT ABLE TO GET OUT 
OF BED WITHOUT ASSISTANCE.  PT RECEIVES $1030 IN SOCIAL SECURITY AND $130 IN 
VETERANS DISABILITY MONTHLY.  PT WOULD ONLY GO TO A VA HOME AS A LAST RESORT 
AND IS ONLY 10% SERVICE CONNECTED WITH DISABILITY.  PT DENIES HAVING ANY 
 
OTHER ASSETS.  PT LIN WITH [employer] IN [place].  PT REPORTS BEING PAID UP 
FOR THE REST OF THE MONTH'S RENT AT THE [place] [facility] AUTHOR[facility] 
[facility], APARTMENT [address].  CM DISCUSSED SKILLED NURSING FACILITY FOR REHAB, PT 
REPORTS WILLINGNESS FOR REHAB BUT STATES HE NEEDS LONG TERM CARE TOO.  
LISTING OF NURSING HOMES PROVIDED, PT SIGNED CONSENT FOR NO SKILLED NURSING 
FACILITY PREFERENCE.  IMPORTANT MESSAGE FROM MEDICARE PROVIDED AND EXPLAINED. 
  
CM NOTIFIED LASHAWN OF [facility] OF REHAB AND LONG TERM CARE REFERRAL, 
[phone].  CM FAXED REFERRAL TO LASHAWN OF [facility] [phone].  
  
CM WAITING ADMISSION DETERMINATION FROM THE [facility] NURSING AND REHAB FOR REHAB 
AND LONG TERM CARE PLACEMENT.  
  
: Migel Vallecillo
 DCPIA - Discharge Planning Initial Assessment
 
Updated by [email or web address]: Migel Vallecillo on 19   2:58 pm
*  Is the patient Alert and Oriented?
Yes
*  How many steps to enter\exit or inside your home? ELEVATOR *  PCP DR. MEJIA - [facility]
*  Pharmacy
[facility] MAIL ORDER
 
*  Preadmission Environment
Home Alone
*  ADLs
Independent
*  Equipment
Walker
*  Other Equipment
[employer], MEDICAL EQUIPMENT PROVIDER
*  List name and contact numbers for known caregivers / representatives who 
currently or will assist patient after discharge:
RANULFO BLANCA, FRIEND, [phone]
*  Additional services required to return to the preadmission environment?
No
*  Can the patient safely return to the preadmission environment?
No
*  Has this patient been hospitalized within the prior 30 days at any 
hospital?
No
 
External Providers
External Provider: [facility]
 
Next Contact Date: 2019
Service Request Date: 
Service Type: 
Resolution: 
 
Reviewer: 
 
Comments: 
 
 
 
 
Coverage Notice
 
Reviewer: [id number] Vallecillo
 
Notice Issued Date-Time: 2019  13:30
Notice Type: IM Discharge Notice
 
Notice Delivered To: Patient
Relationship to Patient: 
Representative Name: 
 
Delivery Method: HAND - Hand Delivered
Emili Days:
Prior Verbal Notification: 
 
Recipient Understood Notice: Yes
Recipient Signature: Yes
Med Rec Note Co-signed by Attending:
 
Coverage Notice Comment:  
Reviewer: MAVERICK Vallecillo
 
Notice Issued Date-Time: 2019  13:30
Notice Type: Patient Choice Letter
 
Notice Delivered To: Patient
Relationship to Patient: 
Representative Name: 
 
Delivery Method: HAND - Hand Delivered
Emili Days:
Prior Verbal Notification: 
 
Recipient Understood Notice: Yes
Recipient Signature: Yes
Med Rec Note Co-signed by Attending:
 
Coverage Notice Comment:  NO SKILLED NURSING FACILITY PREFERENCE
Reviewer: [id number] Amanda Vallecillo
 
Notice Issued Date-Time: 2019   9:35
Notice Type: IM Discharge Notice
 
Notice Delivered To: Patient
Relationship to Patient: 
Representative Name: 
 
 
Delivery Method: HAND - Hand Delivered
Emili Days:
Prior Verbal Notification: 
 
Recipient Understood Notice: Yes
Recipient Signature: Yes
Med Rec Note Co-signed by Attending:
 
Coverage Notice Comment:  
Reviewer: [email or web address] Amanda Bello
 
Notice Issued Date-Time: 2019  13:26
Notice Type: IM Discharge Notice
 
Notice Delivered To: Patient
Relationship to Patient: 
Representative Name: 
 
Delivery Method: HAND - Hand Delivered
Emili Days:
Prior Verbal Notification: 
 
Recipient Understood Notice: 
Recipient Signature: 
Med Rec Note Co-signed by Attending:
 
Coverage Notice Comment:  
 
Last DP export: 8/15/19   7:04 a
Patient Name: NEVILLE ESTEBAN
Encounter #: [id number]
Page [id number]
 
 
 
 
 
Electronically Signed by ANA TOBAR on 19 at 1329
 
 
 
 
 
 
**All edits/amendments must be made on the electronic document**
 
DICTATION DATE: 19     : KERON  19 1328     
RPT#: [id number]                                DC DATE:        
                                               STATUS: ADM IN  
[facility]
[address] [place] [address]
***END OF REPORT***

## 2019-08-18 NOTE — NUR
PT WAS TO BE STARTED ON A PROTONIX DRIP FOR HIS BLOODY STOOLS. I WENT IN AND
EXPLAINED TO PT WHAT THE DRIP WAS FOR AND THAT IT WAS FAIRLY STANDARD FOR
ANYONE WHO HAS BLOODY STOOLS. PT STATED THAT IF IT WASN'T GOING TO HELP HIS
DIAHREAH HE DIDN'T WANT IT. I FURTHER EXPLAINED TO THE PT WHY IT WAS
IMPORTANT, PT REFUSED EVERYTHING. STATING HE'S HAD BLOODY STOOLS FOR 3 MONTHS
AND HE DOESN'T CARE ABOUT THAT. LET NEEL BURTON KNOW, PT ALSO REFUSES TO GET IT
I/V PUSH.

## 2019-08-19 VITALS — SYSTOLIC BLOOD PRESSURE: 142 MMHG | DIASTOLIC BLOOD PRESSURE: 76 MMHG

## 2019-08-19 VITALS — DIASTOLIC BLOOD PRESSURE: 60 MMHG | SYSTOLIC BLOOD PRESSURE: 99 MMHG

## 2019-08-19 VITALS — SYSTOLIC BLOOD PRESSURE: 146 MMHG | DIASTOLIC BLOOD PRESSURE: 62 MMHG

## 2019-08-19 VITALS — DIASTOLIC BLOOD PRESSURE: 68 MMHG | SYSTOLIC BLOOD PRESSURE: 137 MMHG

## 2019-08-19 VITALS — DIASTOLIC BLOOD PRESSURE: 74 MMHG | SYSTOLIC BLOOD PRESSURE: 113 MMHG

## 2019-08-19 VITALS — SYSTOLIC BLOOD PRESSURE: 119 MMHG | DIASTOLIC BLOOD PRESSURE: 59 MMHG

## 2019-08-19 LAB
ANION GAP SERPL CALC-SCNC: 7.9 MMOL/L (ref 8–16)
BASOPHILS NFR BLD AUTO: 0.3 % (ref 0–2)
BUN SERPL-MCNC: 17 MG/DL (ref 7–18)
CALCIUM SERPL-MCNC: 8.1 MG/DL (ref 8.5–10.1)
CHLORIDE SERPL-SCNC: 105 MMOL/L (ref 98–107)
CO2 SERPL-SCNC: 31.6 MMOL/L (ref 21–32)
CREAT SERPL-MCNC: 0.7 MG/DL (ref 0.6–1.3)
EOSINOPHIL NFR BLD: 8 % (ref 0–7)
ERYTHROCYTE [DISTWIDTH] IN BLOOD BY AUTOMATED COUNT: 16.7 % (ref 11.5–14.5)
GLUCOSE SERPL-MCNC: 72 MG/DL (ref 74–106)
HCT VFR BLD CALC: 44.2 % (ref 42–54)
HCT VFR BLD CALC: 44.7 % (ref 42–54)
HCT VFR BLD CALC: 47.1 % (ref 42–54)
HGB BLD-MCNC: 13.1 G/DL (ref 13.5–17.5)
HGB BLD-MCNC: 13.4 G/DL (ref 13.5–17.5)
HGB BLD-MCNC: 14 G/DL (ref 13.5–17.5)
IMM GRANULOCYTES NFR BLD: 0.5 % (ref 0–5)
LYMPHOCYTES NFR BLD AUTO: 3.3 % (ref 15–50)
MCH RBC QN AUTO: 26.3 PG (ref 26–34)
MCHC RBC AUTO-ENTMCNC: 30 G/DL (ref 31–37)
MCV RBC: 87.8 FL (ref 80–100)
MONOCYTES NFR BLD: 13.7 % (ref 2–11)
NEUTROPHILS NFR BLD AUTO: 74.2 % (ref 40–80)
OSMOLALITY SERPL CALC.SUM OF ELEC: 281 MOSM/KG (ref 275–300)
PLATELET # BLD: 282 10X3/UL (ref 130–400)
PMV BLD AUTO: 12.5 FL (ref 7.4–10.4)
POTASSIUM SERPL-SCNC: 3.5 MMOL/L (ref 3.5–5.1)
RBC # BLD AUTO: 5.09 10X6/UL (ref 4.2–6.1)
SODIUM SERPL-SCNC: 141 MMOL/L (ref 136–145)
WBC # BLD AUTO: 19.2 10X3/UL (ref 4.8–10.8)

## 2019-08-19 NOTE — NUR
VSS THROUGHOUT NIGHT.  PT RESTED INTERMITTENTLY DURING SHIFT.  REPOSITIONED IN
BED SEVERAL TIMES DURING THE NIGHT.  NEEDS MET; WILL CONTINUE TO MONITOR.

## 2019-08-19 NOTE — NUR
PATIENT IS SITTING UP IN BED TO EAT. FAMILY AT BEDSIDE ENCOURAGING HIM TO
ACCEPT THE NEXT STEP WHICH WILL BE HIM MOVING TO THE [facility].

## 2019-08-19 NOTE — NUR
REPORT RECIEVED AND ROUNDING COMPLETE. PATIENT LAYING IN BED IN SUPINE
POSITION. REVIEWED MEDICAITION WITH PATIENT, PATIENT TOLD ME WHICH ONES HE
WOULD TAKE AND ONES HE DID NOT WISH TO TAKE. PATIENT HAS A LEFT UPPER ARM PIV
THAT IS RED BUT PATIENT STATES NO PAIN AND TO LEAVE IT BE. PATIENT HAS
PROTONIX RUNNING AT THIS TIME. PATIENT IS WEARING NASAL CANNULA WITH 02 AT 3L.
PATIENT IS SHOWING NO S/SX OF DISTRESS AT THIS TIME. CALL LIGHT WITHIN REACH
AND BED IN LOWEST POSITION. PATIENT REFUSES TO WEAR HEART MONITOR AT THIS
TIME. PATIENT STATES HE HAS NO NEEDS AT THIS TIME.

## 2019-08-19 NOTE — NUR
PT COMPLAINING ABOUT THE NOSIE.  INFORMEDHIM IT WAS NOT STAFF BUT A FAMILY
MEMBER NEAR BY.  ASKED FAMILY MEMBER TO LOWER VOICE.  SR UP X2, CALL LIGHT
WITHIN REACH.

## 2019-08-20 VITALS — DIASTOLIC BLOOD PRESSURE: 81 MMHG | SYSTOLIC BLOOD PRESSURE: 124 MMHG

## 2019-08-20 VITALS — DIASTOLIC BLOOD PRESSURE: 81 MMHG | SYSTOLIC BLOOD PRESSURE: 135 MMHG

## 2019-08-20 VITALS — DIASTOLIC BLOOD PRESSURE: 73 MMHG | SYSTOLIC BLOOD PRESSURE: 114 MMHG

## 2019-08-20 VITALS — DIASTOLIC BLOOD PRESSURE: 62 MMHG | SYSTOLIC BLOOD PRESSURE: 108 MMHG

## 2019-08-20 VITALS — SYSTOLIC BLOOD PRESSURE: 128 MMHG | DIASTOLIC BLOOD PRESSURE: 66 MMHG

## 2019-08-20 LAB
ANION GAP SERPL CALC-SCNC: 6.3 MMOL/L (ref 8–16)
BUN SERPL-MCNC: 21 MG/DL (ref 7–18)
CALCIUM SERPL-MCNC: 8.1 MG/DL (ref 8.5–10.1)
CHLORIDE SERPL-SCNC: 106 MMOL/L (ref 98–107)
CO2 SERPL-SCNC: 36.1 MMOL/L (ref 21–32)
CREAT SERPL-MCNC: 0.8 MG/DL (ref 0.6–1.3)
EOSINOPHIL NFR BLD: 2 % (ref 0–7)
ERYTHROCYTE [DISTWIDTH] IN BLOOD BY AUTOMATED COUNT: 16.6 % (ref 11.5–14.5)
GLUCOSE SERPL-MCNC: 87 MG/DL (ref 74–106)
HCT VFR BLD CALC: 44.6 % (ref 42–54)
HCT VFR BLD CALC: 46.6 % (ref 42–54)
HCT VFR BLD CALC: 47.3 % (ref 42–54)
HGB BLD-MCNC: 13.4 G/DL (ref 13.5–17.5)
HGB BLD-MCNC: 14.2 G/DL (ref 13.5–17.5)
HGB BLD-MCNC: 14.3 G/DL (ref 13.5–17.5)
LYMPHOCYTES NFR BLD AUTO: 1 % (ref 15–50)
MCH RBC QN AUTO: 26.3 PG (ref 26–34)
MCHC RBC AUTO-ENTMCNC: 30 G/DL (ref 31–37)
MCV RBC: 87.5 FL (ref 80–100)
MONOCYTES NFR BLD: 6 % (ref 2–11)
NEUTROPHILS NFR BLD AUTO: 90 % (ref 40–80)
OSMOLALITY SERPL CALC.SUM OF ELEC: 290 MOSM/KG (ref 275–300)
PLATELET # BLD EST: NORMAL 10*3/UL
PLATELET # BLD: 292 10X3/UL (ref 130–400)
PMV BLD AUTO: 12.8 FL (ref 7.4–10.4)
POTASSIUM SERPL-SCNC: 3.4 MMOL/L (ref 3.5–5.1)
RBC # BLD AUTO: 5.1 10X6/UL (ref 4.2–6.1)
SODIUM SERPL-SCNC: 145 MMOL/L (ref 136–145)
WBC # BLD AUTO: 23.4 10X3/UL (ref 4.8–10.8)

## 2019-08-20 NOTE — NUR
PT RESTING IN BED, SHIFT ASSESSMENT PERFORMED. POSEY ALARM ON. DENIES ANY
NEEDS AT THIS TIME, WILL CONT TO FOLLOW POC

## 2019-08-20 NOTE — NUR
PT BOWEL MOVEMENTS CLEAR AND MINIMAL. PT NPO FOR COLONOSCOPY WITH DR. VOICE AT
THIS TIME. BED LOW CALL LIGHT WITHIN REACH. WILL CONTINUE TO MONITOR.

## 2019-08-20 NOTE — NUR
PT RESTING IN BED ALERT AND ORIENTED X4. CHANGED PT LINEN. NO S/S OF DISTRESS
AT THIS TIME. BED LOW, CALL LIGHT WITHIN REACH. WILL CONTINUE TO MONITOR.

## 2019-08-21 VITALS — SYSTOLIC BLOOD PRESSURE: 125 MMHG | DIASTOLIC BLOOD PRESSURE: 70 MMHG

## 2019-08-21 VITALS — DIASTOLIC BLOOD PRESSURE: 68 MMHG | SYSTOLIC BLOOD PRESSURE: 121 MMHG

## 2019-08-21 VITALS — DIASTOLIC BLOOD PRESSURE: 72 MMHG | SYSTOLIC BLOOD PRESSURE: 132 MMHG

## 2019-08-21 VITALS — SYSTOLIC BLOOD PRESSURE: 128 MMHG | DIASTOLIC BLOOD PRESSURE: 78 MMHG

## 2019-08-21 VITALS — SYSTOLIC BLOOD PRESSURE: 117 MMHG | DIASTOLIC BLOOD PRESSURE: 66 MMHG

## 2019-08-21 LAB
ANION GAP SERPL CALC-SCNC: 5.5 MMOL/L (ref 8–16)
BASOPHILS NFR BLD AUTO: 0.3 % (ref 0–2)
BUN SERPL-MCNC: 17 MG/DL (ref 7–18)
CALCIUM SERPL-MCNC: 8.2 MG/DL (ref 8.5–10.1)
CHLORIDE SERPL-SCNC: 106 MMOL/L (ref 98–107)
CO2 SERPL-SCNC: 36.7 MMOL/L (ref 21–32)
CREAT SERPL-MCNC: 0.8 MG/DL (ref 0.6–1.3)
EOSINOPHIL NFR BLD: 6.6 % (ref 0–7)
ERYTHROCYTE [DISTWIDTH] IN BLOOD BY AUTOMATED COUNT: 16.7 % (ref 11.5–14.5)
GLUCOSE SERPL-MCNC: 72 MG/DL (ref 74–106)
HCT VFR BLD CALC: 45.2 % (ref 42–54)
HCT VFR BLD CALC: 45.6 % (ref 42–54)
HCT VFR BLD CALC: 46.2 % (ref 42–54)
HCT VFR BLD CALC: 46.9 % (ref 42–54)
HGB BLD-MCNC: 13.4 G/DL (ref 13.5–17.5)
HGB BLD-MCNC: 13.9 G/DL (ref 13.5–17.5)
HGB BLD-MCNC: 14.1 G/DL (ref 13.5–17.5)
HGB BLD-MCNC: 14.2 G/DL (ref 13.5–17.5)
IMM GRANULOCYTES NFR BLD: 0.8 % (ref 0–5)
LYMPHOCYTES NFR BLD AUTO: 3.4 % (ref 15–50)
MCH RBC QN AUTO: 26.2 PG (ref 26–34)
MCHC RBC AUTO-ENTMCNC: 29.6 G/DL (ref 31–37)
MCV RBC: 88.3 FL (ref 80–100)
MONOCYTES NFR BLD: 16.7 % (ref 2–11)
NEUTROPHILS NFR BLD AUTO: 72.2 % (ref 40–80)
OSMOLALITY SERPL CALC.SUM OF ELEC: 289 MOSM/KG (ref 275–300)
PLATELET # BLD: 282 10X3/UL (ref 130–400)
PMV BLD AUTO: 12.8 FL (ref 7.4–10.4)
POTASSIUM SERPL-SCNC: 3.2 MMOL/L (ref 3.5–5.1)
RBC # BLD AUTO: 5.12 10X6/UL (ref 4.2–6.1)
SODIUM SERPL-SCNC: 145 MMOL/L (ref 136–145)
WBC # BLD AUTO: 17.8 10X3/UL (ref 4.8–10.8)

## 2019-08-21 PROCEDURE — 0DBM8ZX EXCISION OF DESCENDING COLON, VIA NATURAL OR ARTIFICIAL OPENING ENDOSCOPIC, DIAGNOSTIC: ICD-10-PCS | Performed by: INTERNAL MEDICINE

## 2019-08-21 PROCEDURE — 0DBN8ZX EXCISION OF SIGMOID COLON, VIA NATURAL OR ARTIFICIAL OPENING ENDOSCOPIC, DIAGNOSTIC: ICD-10-PCS | Performed by: INTERNAL MEDICINE

## 2019-08-21 NOTE — NUR
I have reviewed this patient and i concur with shift assessment conducted by
Licensed Practical Nurse.

## 2019-08-21 NOTE — NUR
PT RESTING IN BED, SHIFT ASSESSMENT PERFORMED. DENIES ANY NEEDS AT THIS TIME.
 WILL CONT TO FOLLOW POC

## 2019-08-21 NOTE — NUR
PT RESTING IN BED WITH EYES CLOSED RR EVEN AND UNLABORED. PT NPO AT THIS TIME.
BED LOW CALL LIGHT WITHIN REACH. WILL CONTINUE TO MONITOR.

## 2019-08-21 NOTE — NUR
Nutrition Follow-up:
S/p colonoscopy finding ulcerative colitis. PO intake fluctuates.
Wt: 114# (BMI: 15.5)
Labs noted: K+ 3.2, Ca 8.2
Meds reviewed
Continue current diet as tolerated. +Ensure 1x/day (pt does not want with all
meals). Encourage PO intake. Honor food preferences. RD following.

## 2019-08-22 VITALS — SYSTOLIC BLOOD PRESSURE: 112 MMHG | DIASTOLIC BLOOD PRESSURE: 65 MMHG

## 2019-08-22 VITALS — DIASTOLIC BLOOD PRESSURE: 70 MMHG | SYSTOLIC BLOOD PRESSURE: 121 MMHG

## 2019-08-22 VITALS — SYSTOLIC BLOOD PRESSURE: 118 MMHG | DIASTOLIC BLOOD PRESSURE: 72 MMHG

## 2019-08-22 VITALS — SYSTOLIC BLOOD PRESSURE: 114 MMHG | DIASTOLIC BLOOD PRESSURE: 65 MMHG

## 2019-08-22 VITALS — DIASTOLIC BLOOD PRESSURE: 78 MMHG | SYSTOLIC BLOOD PRESSURE: 114 MMHG

## 2019-08-22 LAB
ALBUMIN SERPL-MCNC: 2.1 G/DL (ref 3.4–5)
ALP SERPL-CCNC: 92 U/L (ref 46–116)
ALT SERPL-CCNC: 9 U/L (ref 10–68)
ANION GAP SERPL CALC-SCNC: 4.6 MMOL/L (ref 8–16)
BILIRUB SERPL-MCNC: 0.57 MG/DL (ref 0.2–1.3)
BUN SERPL-MCNC: 19 MG/DL (ref 7–18)
CALCIUM SERPL-MCNC: 8.5 MG/DL (ref 8.5–10.1)
CHLORIDE SERPL-SCNC: 106 MMOL/L (ref 98–107)
CO2 SERPL-SCNC: 37.3 MMOL/L (ref 21–32)
CREAT SERPL-MCNC: 0.8 MG/DL (ref 0.6–1.3)
ERYTHROCYTE [DISTWIDTH] IN BLOOD BY AUTOMATED COUNT: 17.2 % (ref 11.5–14.5)
GLOBULIN SER-MCNC: 4.7 G/L
GLUCOSE SERPL-MCNC: 113 MG/DL (ref 74–106)
HCT VFR BLD CALC: 45.2 % (ref 42–54)
HCT VFR BLD CALC: 45.5 % (ref 42–54)
HCT VFR BLD CALC: 46.9 % (ref 42–54)
HGB BLD-MCNC: 13.7 G/DL (ref 13.5–17.5)
HGB BLD-MCNC: 13.9 G/DL (ref 13.5–17.5)
HGB BLD-MCNC: 14.2 G/DL (ref 13.5–17.5)
IMM GRANULOCYTES NFR BLD: 1.2 % (ref 0–5)
LYMPHOCYTES NFR BLD AUTO: 3 % (ref 15–50)
MAGNESIUM SERPL-MCNC: 2 MG/DL (ref 1.8–2.4)
MCH RBC QN AUTO: 26.6 PG (ref 26–34)
MCHC RBC AUTO-ENTMCNC: 30.3 G/DL (ref 31–37)
MCV RBC: 87.8 FL (ref 80–100)
MONOCYTES NFR BLD: 8 % (ref 2–11)
NEUTROPHILS NFR BLD AUTO: 86 % (ref 40–80)
OSMOLALITY SERPL CALC.SUM OF ELEC: 289 MOSM/KG (ref 275–300)
PLATELET # BLD: 287 10X3/UL (ref 130–400)
PMV BLD AUTO: 12.2 FL (ref 7.4–10.4)
POTASSIUM SERPL-SCNC: 3.9 MMOL/L (ref 3.5–5.1)
PROT SERPL-MCNC: 6.8 G/DL (ref 6.4–8.2)
RBC # BLD AUTO: 5.15 10X6/UL (ref 4.2–6.1)
SODIUM SERPL-SCNC: 144 MMOL/L (ref 136–145)
WBC # BLD AUTO: 12.4 10X3/UL (ref 4.8–10.8)

## 2019-08-22 NOTE — MORECARE
CASE MANAGEMENT DISCHARGE SUMMARY
 
 
PATIENT: NEVILLE ESTEBAN                        UNIT: [id number]
ACCOUNT#: [id number]                       ADM DATE: 19
AGE: 79     : 40  SEX: M            ROOM/BED: D.2132    
AUTHOR: AMADOU,DOC                             PHYSICIAN:                               
 
REFERRING PHYSICIAN: GARRISON ARMENTA MD               
DATE OF SERVICE: 19
Discharge Plan
 
 
Patient Name: NEVILLE ESTEBAN
Facility: [facility]:[place]
Encounter #: [id number]
Medical Record #: [id number]
: 1940
Planned Disposition: Skilled Nursing Facility
Anticipated Discharge Date: 19
 
Discharge Date: 
Expected LOS: 8
Initial Reviewer: [id number]
Initial Review Date: 2019
Generated: 19   2:10 pm 
Comments
 
DCP- Discharge Planning
 
Updated by [id number]: Migel Vallecillo on 19  12:08 pm CT
Patient Name:  NEVILLE ESTEBAN   
Encounter No:  [id number]   
:  1940   
Primary Insurance:  MEDICARE PART A ONLY  
Anticipated DC Date: 2019   
Planned Disposition:  Skilled Nursing Facility  
External Planned Provider: THE [facility], MEDICARE REHAB 
BED  
  
  
DCP follow-up note: CM FAXED HOSPITAL UPDATE TO [place] OF THE [facility] AT 
[phone].   
  
FOR DISCHARGE, FAX DISCHARGE INFORMATION TO THE [facility], [phone], 
NURSE REPORT TO BE CALLED TO THE [facility] [phone]. [facility] TO 
ARRANGE VAN TRANSPORTATION.  
  
Migel Vallecillo, VANCE ALVES
DCP- Discharge Planning
 
 
Updated by [id number]: Bailey Bello on 19   2:37 pm CT
Order received for patient to dc to nursing facility. Nursing requesting that 
CM speak to patient to see if the nursing home could accept  today.  Reviewed 
chart, noted the [facility] has accepted back on the .  Spoke to Lashawn with 
the [facility] to see if the patient could be admitted today.  Lashawn said patient 
could come today and they would pick him up at 1400.  Cm called Dr. Armenta, 
informed him the patient could dc today. Dr. Armenta informed cm that the 
patient was not medically stable for dc today because he has had 3 bloody 
diarrhea stools and that he has consulted GI.  CM therefore informed him once 
the patient is medically stable that the patient had acceptance and no 
further consult was needed just a dc order.  CM faxed update to Lashawn at her 
request. Cm also notified Lashawn that the patient was not medically stable 
for dc at this time.   
  
Bailey Bello RN, [facility]
DCP- Discharge Planning
 
Updated by [id number]: Migel Vallecillo on 8/15/19   7:00 am CT
Patient Name:  NEVILLE ESTEBAN   
Encounter No:  [id number]   
:  1940   
Primary Insurance:  MEDICARE PART A ONLY  
Anticipated DC Date: 2019   
Planned Disposition:  Skilled Nursing Facility  
External Planned Provider: [facility] 
BED  
  
  
DCP follow-up note: CM FAXED HOSPITAL UPDATE TO [facility] AT 
[phone].   
  
FOR DISCHARGE, FAX DISCHARGE INFORMATION TO THE [facility], [phone], 
NURSE REPORT TO BE CALLED TO THE [facility] [phone]. [facility] TO 
ARRANGE VAN TRANSPORTATION.  
  
Migel Vallecillo CASE MANAGEMENT
DCP- Discharge Planning
 
Updated by [id number]: Migel Vallecillo on 19   8:57 am CT
Patient Name:  NEVILLE ESTEBAN   
Encounter No:  [id number]   
:  1940   
Primary Insurance:  MEDICARE PART A ONLY  
Anticipated DC Date: 2019   
Planned Disposition:  Skilled Nursing Facility  
External Planned Provider:[facility] MEDICARE REHAB 
BED  
  
  
DCP follow-up note: CM FAXED HOSPITAL UPDATE TO [facility] AT 
[phone].   
 
  
FOR DISCHARGE, FAX DISCHARGE INFORMATION TO THE [facility], [phone], 
NURSE REPORT TO BE CALLED TO THE [facility] [phone]. [facility] TO 
ARRANGE VAN TRANSPORTATION.  
  
Migel Vallecillo CASE MANAGEMENT
DCP- Discharge Planning
 
Updated by [id number]: Migel Vallecillo on 19   8:44 am CT
Patient Name:  NEVILLE ESTEBAN   
Encounter No:  [id number]   
:  1940   
Primary Insurance:  MEDICARE PART A ONLY  
Anticipated DC Date: 2019   
Planned Disposition:  Skilled Nursing Facility  
External Planned Provider[facility] 
BED  
  
  
DCP follow-up note: ON 19 CM SPOKE TO LASHAWN OF THE [facility], [phone], 
PT HAS BEEN ACCEPTED.  NEEL FLOWERS NOTIFIED, CM ADVISED PT HAD FEVER AND 
ELEVATED WBC'S, NOT READY TO DC 19.  CM NOTIFIED LASHAWN OF THE [facility].  
  
FOR DISCHARGE, FAX DISCHARGE INFORMATION TO THE [facility], [phone], 
NURSE REPORT TO BE CALLED TO THE [facility] [phone]. [facility] TO 
ARRANGE VAN TRANSPORTATION.  
  
Migel Vallecillo, CASE MANAGEMENT  
  
Appended by Migel Vallecillo on 2019 9:44 CDT:  
CM NOTIFIED PT OF THE [facility] ACCEPTANCE, PT IN AGREEMENT WITH DISCHARGE TO THE 
[facility]. IMPORTANT MESSAGE FROM MEDICARE PROVIDED AND EXPLAINED.  
  
FOR DISCHARGE, FAX DISCHARGE INFORMATION TO THE [facility], [phone], 
NURSE REPORT TO BE CALLED TO THE [facility] [phone]. [facility] TO 
ARRANGE VAN TRANSPORTATION.  
  
Migel Vallecillo CASE MANAGEMENT
DCP- Discharge Planning
 
Updated by [id number]: Migel Vallecillo on 19   8:10 am CT
Patient Name:  NEVILLE ESTEBAN   
Encounter No:  [id number]   
:  1940   
Primary Insurance:  MEDICARE PART A ONLY  
Anticipated DC Date: 2019   
Planned Disposition:  Skilled Nursing Facility  
External Planned Provider: THE [facility], MEDICARE REHAB BED  
  
  
DCP follow-up note: CM SPOKE TO LASHAWN, PT HAS BEEN ACCEPTED CLINICALLY FOR 
REHAB AT [facility] WITH TRANSITION TO LONG TERM CARE.  PT WILL HAVE TO 
 
PROVIDE FINANCIAL INFORMATION FOR MEDICAID APPLICATION AS PT IS NOT ELIGIBLE 
FOR [facility] BENEFITS.  CM FAXED UPDATE TO LASHAWN FOR [facility] AT 
[phone]. CM SPOKE TO PT IN ROOM, INFORMED PT THAT WITHOUT PARTICIPATION 
IN THERAPY SERVICES, PLACEMENT MAY NOT BE POSSIBLE.  PT PROMISED TO 
PARTICIPATE IN HOSPITAL THERAPY SERVICES.  
  
CM WAITING PT'S PARTICIPATION WITH PHYSICAL THERAPY EVALUATION AND THERAPY 
SERVICES AS WELL AS FINANCIAL ARRANGEMENTS FOR PLACEMENT AT THE [facility].  
  
Migel Vallecillo., CASE MANAGEMENT
DCP- Discharge Planning
 
Updated by [email or web address]: Migel Vallecillo on 19   2:05 pm CT
Patient Name: NEVILLE ESTEBAN                                     
Admission Status: ER   
Accout number: [id number]                              
Admission Date: 2019   
: 1940                                                        
Admission Diagnosis:UNSPECIFIED ABDOMINAL PAIN   
Attending: GARRISON ARMENTA                                                
Current LOS:  5   
  
Anticipated DC Date:    
Planned Disposition: Skilled Nursing Facility   
Primary Insurance: MEDICARE PART A ONLY   
PLANNED EXTERNAL PROVIDER :  THE [facility], MEDICARE REHAB BED 
  
Discharge Planning Comments:   
CM RECEIVED REQUEST FROM PT THROUGH BEDSIDE NURSE TO SPEAK TO PT REGARDING 
DISCHARGE PLANNING.  CM MET WITH PT IN ROOM TO DISCUSS DISCHARGE PLANNING AND 
NEEDS. PT REPORTS LIVING AT HOME INDEPENDENTLY AND ALONE.  PT [facility] 
[facility].  PT HAS HAD NO OUTSIDE SERVICES ASSISTING IN THE HOME. CM 
DISCUSSED AVAILABILITY OF HOME HEALTH, REHAB SERVICES AND MEDICAL EQUIPMENT. 
PT REPORTS HE IS NOT ABLE TO TAKE CARE OF HIMSELF ANY LONGER.  PT WANTS CM TO 
PLACE HIM SOMEWHERE FOR LONG TERM CARE.  PT REPORTS HE IS NOT ABLE TO GET OUT 
OF BED WITHOUT ASSISTANCE.  PT RECEIVES $1030 IN SOCIAL SECURITY AND $130 IN 
[employer] MONTHLY.  PT WOULD ONLY GO TO A [facility] HOME AS A LAST RESORT 
AND IS ONLY 10% SERVICE CONNECTED WITH DISABILITY.  PT DENIES HAVING ANY 
OTHER ASSETS.  PT LIN WITH [facility] IN [place].  PT REPORTS BEING PAID UP 
FOR THE REST OF THE MONTH'S RENT AT THE [place] [facility] 
[facility], APARTMENT [address].  CM DISCUSSED SKILLED NURSING FACILITY FOR REHAB, PT 
REPORTS WILLINGNESS FOR REHAB BUT STATES HE NEEDS LONG TERM CARE TOO.  
LISTING OF NURSING HOMES PROVIDED, PT SIGNED CONSENT FOR NO SKILLED NURSING 
FACILITY PREFERENCE.  IMPORTANT MESSAGE FROM MEDICARE PROVIDED AND EXPLAINED. 
  
CM NOTIFIED LASHAWN OF [facility] OF REHAB AND LONG TERM CARE REFERRAL, 
[phone].  CM FAXED REFERRAL TO LASHAWN OF [facility] [phone].  
  
CM WAITING ADMISSION DETERMINATION FROM THE [facility]SING [facility] RE[facility] FOR REHAB 
AND LONG TERM CARE PLACEMENT.  
  
: Migel Vallecillo
 
 DCPIA - Discharge Planning Initial Assessment
 
Updated by [email or web address]: Migel Vallecillo on 19   2:58 pm
*  Is the patient Alert and Oriented?
Yes
*  How many steps to enter\exit or inside your home? ELEVATOR *  PCP DR. MEJIA - [facility]
*  Pharmacy
[facility] MAIL ORDER
 
*  Preadmission Environment
Home Alone
*  ADLs
Independent
*  Equipment
Walker
*  Other Equipment
[employer], MEDICAL EQUIPMENT PROVIDER
*  List name and contact numbers for known caregivers / representatives who 
currently or will assist patient after discharge:
RANULFO BLANCA, [place], [phone]
*  Additional services required to return to the preadmission environment?
No
*  Can the patient safely return to the preadmission environment?
No
*  Has this patient been hospitalized within the prior 30 days at any 
hospital?
No
 
 
 
 
 
Coverage Notice
 
Reviewer: MAVERICK Vallecillo
 
Notice Issued Date-Time: 2019  13:30
Notice Type: IM Discharge Notice
 
Notice Delivered To: Patient
Relationship to Patient: 
Representative Name: 
 
Delivery Method: HAND - Hand Delivered
Emili Days:
Prior Verbal Notification: 
 
Recipient Understood Notice: Yes
Recipient Signature: Yes
Med Rec Note Co-signed by Attending:
 
 
Coverage Notice Comment:  
Reviewer: MAVERICK Vallecillo
 
Notice Issued Date-Time: 2019  13:30
Notice Type: Patient Choice Letter
 
Notice Delivered To: Patient
Relationship to Patient: 
Representative Name: 
 
Delivery Method: HAND - Hand Delivered
Emili Days:
Prior Verbal Notification: 
 
Recipient Understood Notice: Yes
Recipient Signature: Yes
Med Rec Note Co-signed by Attending:
 
Coverage Notice Comment:  NO SKILLED NURSING FACILITY PREFERENCE
Reviewer: MAVERICK Vallecillo
 
Notice Issued Date-Time: 2019   9:35
Notice Type: IM Discharge Notice
 
Notice Delivered To: Patient
Relationship to Patient: 
Representative Name: 
 
Delivery Method: HAND - Hand Delivered
Emili Days:
Prior Verbal Notification: 
 
Recipient Understood Notice: Yes
Recipient Signature: Yes
Med Rec Note Co-signed by Attending:
 
Coverage Notice Comment:  
Reviewer: [email or web address] Amanda Bello
 
Notice Issued Date-Time: 2019  13:26
Notice Type: IM Discharge Notice
 
Notice Delivered To: Patient
Relationship to Patient: 
Representative Name: 
 
Delivery Method: HAND - Hand Delivered
Emili Days:
Prior Verbal Notification: 
 
Recipient Understood Notice: 
Recipient Signature: 
 
Med Rec Note Co-signed by Attending:
 
Coverage Notice Comment:  
 
Last DP export: 19  11:19 a
Patient Name: NEVILLE ESTEBAN
Encounter #: [id number]
Page [id number]
 
 
 
 
 
Electronically Signed by ANA TOBAR on 19 at 1311
 
 
 
 
 
 
**All edits/amendments must be made on the electronic document**
 
DICTATION DATE: 19 1310     : KERON  19 1310     
RPT#: [id number]                                [place] DATE:        
                                               STATUS: ADM IN  
[facility]
[address] [id number]
***END OF REPORT***

## 2019-08-22 NOTE — NUR
PATIENT IS REFUSING ALL 2100 MEDS EXCEPT FOR FLOMAX, MELATONIN, SINGULAIR, BUT
HE WON'T TAKE THEM UNTIL 2300 HE SAYS. WILL HOLD THOSE 3 MEDS UNTIL 2300.

## 2019-08-22 NOTE — NUR
REPORT RECEIVED, WILL CONTINUE POC. PATIENT IS AAOX4, NONCOMPLIANT, REFUSED
POSEY. NO S/SX OF DISTRESS NOTED. ULTRASOUND TECH AT BEDSIDE. PATIENT DENIES
FURHTER NEEDS AT THIS TIME. IV TO RT FA, PATENT, PROTONIX INFUSING AT 10ML/HR.
RR EVEN AND UNLABORED ON 3L HI FLOW. CL IN REACH, BED LOCKED AND LOWERED. WILL
CTM.

## 2019-08-22 NOTE — [facility]
CASE MANAGEMENT DISCHARGE SUMMARY
 
 
PATIENT: NEVILLE ESTEBAN                        UNIT: [id number]
ACCOUNT#: [id number]                       ADM DATE: 19
AGE: 79     : 40  SEX: M            ROOM/BED: D.2132    
AUTHOR: AMADOU,DOC                             PHYSICIAN:                               
 
REFERRING PHYSICIAN: GARRISON ARMENTA MD               
DATE OF SERVICE: 19
Discharge Plan
 
 
Patient Name: NEVILLE ESTEBAN
Facility: [facility]
Encounter #: [id number]
Medical Record #: [id number]
: 1940
Planned Disposition: Skilled Nursing Facility
Anticipated Discharge Date: 19
 
Discharge Date: 
Expected LOS: 8
Initial Reviewer: [id number]
Initial Review Date: 2019
Generated: 19   1:19 pm 
DCP- Discharge Planning
 
Updated by [email or web address]: Bailey Bello on 19   2:37 pm CT
Order received for patient to dc to nursing facility. Nursing requesting that 
CM speak to patient to see if the nursing home could accept  today.  Reviewed 
chart, noted the [facility] has accepted back on the .  Spoke to Lashawn with 
the [facility] to see if the patient could be admitted today.  Lashawn said patient 
could come today and they would pick him up at 1400.  Cm called Dr. Armenta, 
informed him the patient could dc today. Dr. Armenta informed cm that the 
patient was not medically stable for dc today because he has had 3 bloody 
diarrhea stools and that he has consulted GI.  CM therefore informed him once 
the patient is medically stable that the patient had acceptance and no 
further consult was needed just a dc order.  CM faxed update to Lashawn at her 
request. Cm also notified Lashawn that the patient was not medically stable 
for dc at this time.   
  
Bailey Bello RN, [facility]
DCP- Discharge Planning
 
Updated by [id number]: Migel Vallecillo on 8/15/19   7:00 am CT
Patient Name:  NEVILLE ESTEBAN   
Encounter No:  [id number]   
:  1940   
Primary Insurance:  MEDICARE PART A ONLY  
 
Anticipated DC Date: 2019   
Planned Disposition:  Skilled Nursing Facility  
External Planned Provider: [facility]HAB 
BED  
  
  
DCP follow-up note: CM FAXED HOSPITAL UPDATE TO LASHAWN OF THE [facility] AT 
[phone].   
  
FOR DISCHARGE, FAX DISCHARGE INFORMATION TO THE [facility], [phone], 
NURSE REPORT TO BE CALLED TO THE [facility] [phone]. [facility] TO 
ARRANGE VAN TRANSPORTATION.  
  
Migel Vallecillo CASE MANAGEMENT
DCP- Discharge Planning
 
Updated by [email or web address]: Migel Vallecillo on 19   8:57 am CT
Patient Name:  NEVILLE ESTEBAN   
Encounter No:  [id number]   
:  1940   
Primary Insurance:  MEDICARE PART A ONLY  
Anticipated DC Date: 2019   
Planned Disposition:  Skilled Nursing Facility  
External Planned Provider:[facility] 
BED  
  
  
DCP follow-up note: CM FAXED HOSPITAL UPDATE TO LASHAWN OF THE [facility] AT 
[phone].   
  
FOR DISCHARGE, FAX DISCHARGE INFORMATION TO THE [facility], [phone], 
NURSE REPORT TO BE CALLED TO THE [facility] [phone]. [facility] TO 
ARRANGE VAN TRANSPORTATION.  
  
Migel Vallecillo CASE MANAGEMENT
DCP- Discharge Planning
 
Updated by [email or web address]: Migel Vallecillo on 19   8:44 am CT
Patient Name:  NEVILLE ESTEBAN   
Encounter No:  [id number]   
:  1940   
Primary Insurance:  MEDICARE PART A ONLY  
Anticipated DC Date: 2019   
Planned Disposition:  Skilled Nursing Facility  
External Planned Provider: [facility] REH[facility] 
BED  
  
  
DCP follow-up note: ON 19 CM SPOKE TO LASHAWN OF THE [facility], [phone], 
PT HAS BEEN ACCEPTED.  NEEL FLOWERS NOTIFIED, CM ADVISED PT HAD FEVER AND 
ELEVATED WBC'S, NOT READY TO DC 19.  CM NOTIFIED LASHAWN OF THE [facility].  
  
 
FOR DISCHARGE, FAX DISCHARGE INFORMATION TO THE [facility], [phone], 
NURSE REPORT TO BE CALLED TO THE [facility] [phone]. [facility] TO 
ARRANGE VAN TRANSPORTATION.  
  
Migel Vallecillo, CASE MANAGEMENT  
  
Appended by Migel Vallecillo on 2019 9:44 CDT:  
CM NOTIFIED PT OF THE [facility] ACCEPTANCE, PT IN AGREEMENT WITH DISCHARGE TO THE 
[facility]. IMPORTANT MESSAGE FROM MEDICARE PROVIDED AND EXPLAINED.  
  
FOR DISCHARGE, FAX DISCHARGE INFORMATION TO THE [facility], [phone], 
NURSE REPORT TO BE CALLED TO THE [facility] [phone]. [facility] TO 
ARRANGE VAN TRANSPORTATION.  
  
Migel Vallecillo, CASE MANAGEMENT
DCP- Discharge Planning
 
Updated by [id number]: Migel Vallecillo on 19   8:10 am CT
Patient Name:  NEVILLE ESTEBAN   
Encounter No:  [id number]   
:  1940   
Primary Insurance:  MEDICARE PART A ONLY  
Anticipated DC Date: 2019   
Planned Disposition:  Skilled Nursing Facility  
External Planned Provider: [facility], MEDICARE REHAB BED  
  
  
DCP follow-up note: CM SPOKE TO LASHAWN, PT HAS BEEN ACCEPTED CLINICALLY FOR 
REHAB AT THE [facility] WITH TRANSITION TO LONG TERM CARE.  PT WILL HAVE TO 
PROVIDE FINANCIAL INFORMATION FOR MEDICAID APPLICATION AS PT IS NOT ELIGIBLE 
FOR [facility]ENEFITS.  CM FAXED UPDATE TO [place] FOR THE [facility] AT 
[phone]. CM SPOKE TO PT IN ROOM, INFORMED PT THAT WITHOUT PARTICIPATION 
IN THERAPY SERVICES, PLACEMENT MAY NOT BE POSSIBLE.  PT PROMISED TO 
PARTICIPATE IN HOSPITAL THERAPY SERVICES.  
  
CM WAITING PT'S PARTICIPATION WITH PHYSICAL THERAPY EVALUATION AND THERAPY 
SERVICES AS WELL AS FINANCIAL ARRANGEMENTS FOR PLACEMENT AT THE [facility].  
  
Migel Vallecillo., CASE MANAGEMENT
DCP- Discharge Planning
 
Updated by [email or web address]: Migel Vallecillo on 19   2:05 pm CT
Patient Name: NEVILLE ESTEBAN                                     
Admission Status: ER   
Accout number: [id number]                              
Admission Date: 2019   
: 1940                                                        
Admission Diagnosis:UNSPECIFIED ABDOMINAL PAIN   
Attending: GARRISON ARMENTA                                                
Current LOS:  5   
  
Anticipated DC Date:    
 
Planned Disposition: Skilled Nursing Facility   
Primary Insurance: MEDICARE PART A ONLY   
PLANNED EXTERNAL PROVIDER :  [facility], MEDICARE REHAB BED 
  
Discharge Planning Comments:   
CM RECEIVED REQUEST FROM PT THROUGH BEDSIDE NURSE TO SPEAK TO PT REGARDING 
DISCHARGE PLANNING.  CM MET WITH PT IN ROOM TO DISCUSS DISCHARGE PLANNING AND 
NEEDS. PT REPORTS LIVING AT HOME INDEPENDENTLY AND ALONE.  PT [facility].  PT HAS HAD NO OUTSIDE SERVICES ASSISTING IN THE HOME. CM 
DISCUSSED AVAILABILITY OF HOME HEALTH, REHAB SERVICES AND MEDICAL EQUIPMENT. 
PT REPORTS HE IS NOT ABLE TO TAKE CARE OF HIMSELF ANY LONGER.  PT WANTS CM TO 
PLACE HIM SOMEWHERE FOR LONG TERM CARE.  PT REPORTS HE IS NOT ABLE TO GET OUT 
OF BED WITHOUT ASSISTANCE.  PT RECEIVES $1030 IN SOCIAL SECURITY AND $130 IN 
[employer] MONTHLY.  PT WOULD ONLY GO TO A [facility] HOME AS A LAST RESORT 
AND IS ONLY 10% SERVICE CONNECTED WITH DISABILITY.  PT DENIES HAVING ANY 
OTHER ASSETS.  [facility] [place].  PT REPORTS BEING PAID UP 
FOR THE REST OF THE MONTH'S RENT AT [facility] [place] [facility].  CM DISCUSSED SKILLED NURSING FACILITY FOR REHAB, PT 
REPORTS WILLINGNESS FOR REHAB BUT STATES HE NEEDS LONG TERM CARE TOO.  
LISTING OF NURSING HOMES PROVIDED, PT SIGNED CONSENT FOR NO SKILLED NURSING 
FACILITY PREFERENCE.  IMPORTANT MESSAGE FROM MEDICARE PROVIDED AND EXPLAINED. 
  
CM NOTIFIED LASHAWN OF [facility] OF REHAB AND LONG TERM CARE REFERRAL, 
[phone].  CM FAXED REFERRAL TO [place] OF [facility] [phone].  
  
CM WAITING ADMISSION DETERMINATION FROM THE [facility] N[facility]SING AND RE[facility] FOR REHAB 
AND LONG TERM CARE PLACEMENT.  
  
: Migel Vallecillo
 DCPIA - Discharge Planning Initial Assessment
 
Updated by [email or web address]: Migel Vallecillo on 19   2:58 pm
*  Is the patient Alert and Oriented?
Yes
*  How many steps to enter\exit or inside your home? ELEVATOR *  PCP DR. MEJIA - [facility]
*  Pharmacy
[facility] MAIL ORDER
 
*  Preadmission Environment
Home Alone
*  ADLs
Independent
*  Equipment
Walker
*  Other Equipment
[employer], MEDICAL EQUIPMENT PROVIDER
*  List name and contact numbers for known caregivers / representatives who 
currently or will assist patient after discharge:
RANULFO BLANCA, FRIEND, [phone]
*  Additional services required to return to the preadmission environment?
No
 
*  Can the patient safely return to the preadmission environment?
No
*  Has this patient been hospitalized within the prior 30 days at any 
hospital?
No
 
External Providers
External Provider: [facility]
 
Next Contact Date: 2019
Service Request Date: 
Service Type: 
Resolution: 
 
Reviewer: 
Comments: 
 
 
 
 
Coverage Notice
 
Reviewer: [email or web address] - Migel Vallecillo
 
Notice Issued Date-Time: 2019  13:30
Notice Type: IM Discharge Notice
 
Notice Delivered To: Patient
Relationship to Patient: 
Representative Name: 
 
Delivery Method: HAND - Hand Delivered
Emili Days:
Prior Verbal Notification: 
 
Recipient Understood Notice: Yes
Recipient Signature: Yes
Med Rec Note Co-signed by Attending:
 
Coverage Notice Comment:  
Reviewer: [email or web address] Amanda Vallecillo
 
Notice Issued Date-Time: 2019  13:30
Notice Type: Patient Choice Letter
 
Notice Delivered To: Patient
Relationship to Patient: 
Representative Name: 
 
Delivery Method: HAND - Hand Delivered
Emili Days:
Prior Verbal Notification: 
 
 
Recipient Understood Notice: Yes
Recipient Signature: Yes
Med Rec Note Co-signed by Attending:
 
Coverage Notice Comment:  NO SKILLED NURSING FACILITY PREFERENCE
Reviewer: [email or web address] Amanda Vallecillo
 
Notice Issued Date-Time: 2019   9:35
Notice Type: IM Discharge Notice
 
Notice Delivered To: Patient
Relationship to Patient: 
Representative Name: 
 
Delivery Method: HAND - Hand Delivered
Emili Days:
Prior Verbal Notification: 
 
Recipient Understood Notice: Yes
Recipient Signature: Yes
Med Rec Note Co-signed by Attending:
 
Coverage Notice Comment:  
Reviewer: [email or web address] Amanda Bello
 
Notice Issued Date-Time: 2019  13:26
Notice Type: IM Discharge Notice
 
Notice Delivered To: Patient
Relationship to Patient: 
Representative Name: 
 
Delivery Method: HAND - Hand Delivered
Emili Days:
Prior Verbal Notification: 
 
Recipient Understood Notice: 
Recipient Signature: 
Med Rec Note Co-signed by Attending:
 
Coverage Notice Comment:  
 
Last DP export: 19   2:40 p
Patient Name: NEVILLE ESTEBAN
 
Encounter #: [id number]
Page [id number]
 
 
 
 
 
Electronically Signed by ANA TOBAR on 19 at 1219
 
 
 
 
 
 
**All edits/amendments must be made on the electronic document**
 
DICTATION DATE: 19 1218     : KERON  19 1218     
RPT#: [id number]                                DC DATE:        
                                               STATUS: ADM IN  
[facility]
[address] [place] [address]
***END OF REPORT***

## 2019-08-23 VITALS — SYSTOLIC BLOOD PRESSURE: 122 MMHG | DIASTOLIC BLOOD PRESSURE: 74 MMHG

## 2019-08-23 VITALS — SYSTOLIC BLOOD PRESSURE: 128 MMHG | DIASTOLIC BLOOD PRESSURE: 72 MMHG

## 2019-08-23 VITALS — SYSTOLIC BLOOD PRESSURE: 100 MMHG | DIASTOLIC BLOOD PRESSURE: 59 MMHG

## 2019-08-23 VITALS — DIASTOLIC BLOOD PRESSURE: 65 MMHG | SYSTOLIC BLOOD PRESSURE: 108 MMHG

## 2019-08-23 VITALS — DIASTOLIC BLOOD PRESSURE: 68 MMHG | SYSTOLIC BLOOD PRESSURE: 126 MMHG

## 2019-08-23 VITALS — DIASTOLIC BLOOD PRESSURE: 71 MMHG | SYSTOLIC BLOOD PRESSURE: 131 MMHG

## 2019-08-23 LAB
ALBUMIN SERPL-MCNC: 2 G/DL (ref 3.4–5)
ALP SERPL-CCNC: 89 U/L (ref 46–116)
ALT SERPL-CCNC: 8 U/L (ref 10–68)
ANION GAP SERPL CALC-SCNC: 2.6 MMOL/L (ref 8–16)
BASOPHILS NFR BLD AUTO: 0.1 % (ref 0–2)
BILIRUB SERPL-MCNC: 0.55 MG/DL (ref 0.2–1.3)
BUN SERPL-MCNC: 19 MG/DL (ref 7–18)
CALCIUM SERPL-MCNC: 8.1 MG/DL (ref 8.5–10.1)
CHLORIDE SERPL-SCNC: 105 MMOL/L (ref 98–107)
CO2 SERPL-SCNC: 38.5 MMOL/L (ref 21–32)
CREAT SERPL-MCNC: 0.8 MG/DL (ref 0.6–1.3)
EOSINOPHIL NFR BLD: 1.2 % (ref 0–7)
ERYTHROCYTE [DISTWIDTH] IN BLOOD BY AUTOMATED COUNT: 17 % (ref 11.5–14.5)
GLOBULIN SER-MCNC: 4.8 G/L
GLUCOSE SERPL-MCNC: 120 MG/DL (ref 74–106)
HCT VFR BLD CALC: 44 % (ref 42–54)
HCT VFR BLD CALC: 44.5 % (ref 42–54)
HCT VFR BLD CALC: 46.6 % (ref 42–54)
HGB BLD-MCNC: 13.3 G/DL (ref 13.5–17.5)
HGB BLD-MCNC: 13.5 G/DL (ref 13.5–17.5)
HGB BLD-MCNC: 14.1 G/DL (ref 13.5–17.5)
IMM GRANULOCYTES NFR BLD: 1.2 % (ref 0–5)
LYMPHOCYTES NFR BLD AUTO: 2.7 % (ref 15–50)
MAGNESIUM SERPL-MCNC: 2.1 MG/DL (ref 1.8–2.4)
MCH RBC QN AUTO: 26.4 PG (ref 26–34)
MCHC RBC AUTO-ENTMCNC: 30.2 G/DL (ref 31–37)
MCV RBC: 87.5 FL (ref 80–100)
MONOCYTES NFR BLD: 4.4 % (ref 2–11)
NEUTROPHILS NFR BLD AUTO: 90.4 % (ref 40–80)
OSMOLALITY SERPL CALC.SUM OF ELEC: 285 MOSM/KG (ref 275–300)
PLATELET # BLD: 259 10X3/UL (ref 130–400)
PMV BLD AUTO: 12.3 FL (ref 7.4–10.4)
POTASSIUM SERPL-SCNC: 4.1 MMOL/L (ref 3.5–5.1)
PROT SERPL-MCNC: 6.8 G/DL (ref 6.4–8.2)
RBC # BLD AUTO: 5.03 10X6/UL (ref 4.2–6.1)
SODIUM SERPL-SCNC: 142 MMOL/L (ref 136–145)
WBC # BLD AUTO: 19.2 10X3/UL (ref 4.8–10.8)

## 2019-08-23 NOTE — [facility]
CASE MANAGEMENT DISCHARGE SUMMARY
 
 
PATIENT: NEVILLE ESTEBAN                        UNIT: [id number]
ACCOUNT#: [id number]                       ADM DATE: 19
AGE: 79     : 40  SEX: M            ROOM/BED: D.2132    
AUTHOR: AMADOU,DOC                             PHYSICIAN:                               
 
REFERRING PHYSICIAN: GARRISON ARMENTA MD               
DATE OF SERVICE: 19
Discharge Plan
 
 
Patient Name: NEVILLE ESTEBAN
Facility: [facility]
Encounter #: [id number]
Medical Record #: [id number]
: 1940
Planned Disposition: Skilled Nursing Facility
Anticipated Discharge Date: 19
 
Discharge Date: 
Expected LOS: 26
Initial Reviewer: [email or web address]
Initial Review Date: 2019
Generated: 19   6:34 pm 
Comments
 
DCP- Discharge Planning
 
Updated by [email or web address]: Migel Vallecillo on 19   4:31 pm CT
Patient Name:  NEVILLE ESTEBAN   
Encounter No:  [id number]   
:  1940   
Primary Insurance:  MEDICARE PART A ONLY  
Anticipated DC Date: 2019   
Planned Disposition:  Skilled Nursing Facility  
External Planned Provider: [facility] 
BED  
  
  
DCP follow-up note: CM FAXED HOSPITAL UPDATE TO [facility] AT 
[phone].   CM NOTIFIED [place] THAT PT WILL REQUIRE SCD'S AND MARIA G HOSE AS 
WELL AS LONG TERM STEROID THERAPY WHILE IN [facility] HOME.  LASHAWN WILL ENSURE 
THAT SCD'S CAN BE PROVIDED BY THE [facility].  
  
FOR DISCHARGE, FAX DISCHARGE INFORMATION TO THE [facility], [phone], 
NURSE REPORT TO BE CALLED TO THE [facility] -[phone]. [facility] TO 
ARRANGE VAN TRANSPORTATION.  
  
 
Migel Vallecillo, CASE MANAGEMENT
DCP- Discharge Planning
 
Updated by [id number]: Migel Vallecillo on 19  12:08 pm CT
Patient Name:  NEVILLE ESTEBAN   
Encounter No:  [id number]   
:  1940   
Primary Insurance:  MEDICARE PART A ONLY  
Anticipated DC Date: 2019   
Planned Disposition:  Skilled Nursing Facility  
External Planned Provider: [facility] 
BED  
  
  
DCP follow-up note: CM FAXED HOSPITAL UPDATE TO [facility] AT 
[phone].   
  
FOR DISCHARGE, FAX DISCHARGE INFORMATION TO THE [facility], [phone], 
NURSE REPORT TO BE CALLED TO THE [facility] -[phone]. [facility] TO 
ARRANGE VAN TRANSPORTATION.  
  
Migel Vallecillo CASE MANAGEMENT
DCP- Discharge Planning
 
Updated by [email or web address]: Bailey Bello on 19   2:37 pm CT
Order received for patient to dc to nursing facility. Nursing requesting that 
CM speak to patient to see if the nursing home could accept  today.  Reviewed 
chart, noted the [facility] has accepted back on the .  Spoke to Lashawn with 
the [facility] to see if the patient could be admitted today.  Lashawn said patient 
could come today and they would pick him up at 1400.  Cm called Dr. Armenta, 
informed him the patient could dc today. Dr. Armenta informed cm that the 
patient was not medically stable for dc today because he has had 3 bloody 
diarrhea stools and that he has consulted GI.  CM therefore informed him once 
the patient is medically stable that the patient had acceptance and no 
further consult was needed just a dc order.  CM faxed update to Lashawn at her 
request. Cm also notified Lashawn that the patient was not medically stable 
for dc at this time.   
  
Bailey Bello RN, [facility]
DCP- Discharge Planning
 
Updated by [email or web address]: Migel Vallecillo on 8/15/19   7:00 am CT
Patient Name:  NEVILLE ESTEBAN   
Encounter No:  [id number]   
:  1940   
Primary Insurance:  MEDICARE PART A ONLY  
Anticipated DC Date: 2019   
Planned Disposition:  Skilled Nursing Facility  
External Planned Provider: [facility] 
[facility]ED  
  
  
 
DCP follow-up note: CM FAXED HOSPITAL UPDATE TO [facility] AT 
[phone].   
  
FOR DISCHARGE, FAX DISCHARGE INFORMATION TO THE [facility], [phone], 
NURSE REPORT TO BE CALLED TO THE [facility] -[phone]. [facility] TO 
ARRANGE VAN TRANSPORTATION.  
  
Migel Vallecillo CASE MANAGEMENT
DCP- Discharge Planning
 
Updated by [email or web address]: Migel Vallecillo on 19   8:57 am CT
Patient Name:  NEVILLE ESTEBAN   
Encounter No:  [id number]   
:  1940   
Primary Insurance:  MEDICARE PART A ONLY  
Anticipated DC Date: 2019   
Planned Disposition:  Skilled Nursing Facility  
External Planned Provider:[facility] 
[facility]ED  
  
  
DCP follow-up note: CM FAXED HOSPITAL UPDATE TO LASHAWN OF THE [facility] AT 
[phone].   
  
FOR DISCHARGE, FAX DISCHARGE INFORMATION TO THE [facility], [phone], 
NURSE REPORT TO BE CALLED TO THE [facility] -[phone]. [facility] TO 
ARRANGE VAN TRANSPORTATION.  
  
VANCE Yousif
DCP- Discharge Planning
 
Updated by [email or web address]: Migel Vallecillo on 19   8:44 am CT
Patient Name:  NEVILLE ESTEBAN   
Encounter No:  [id number]   
:  1940   
Primary Insurance:  MEDICARE PART A ONLY  
Anticipated DC Date: 2019   
Planned Disposition:  Skilled Nursing Facility  
External Planned Provider: [facility] 
[facility]ED  
  
  
DCP follow-up note: ON 19 CM SPOKE TO LASHAWN OF THE [facility], [phone], 
PT HAS BEEN ACCEPTED.  NEEL FLOWERS NOTIFIED, CM ADVISED PT HAD FEVER AND 
ELEVATED WBC'S, NOT READY TO DC 19.  CM NOTIFIED LASHAWN OF THE [facility].  
  
FOR DISCHARGE, FAX DISCHARGE INFORMATION TO THE [facility], [phone], 
NURSE REPORT TO BE CALLED TO THE [facility] [phone]. [facility] TO 
ARRANGE VAN TRANSPORTATION.  
  
Migel Vallecillo, CASE MANAGEMENT  
  
 
Appended by Migel Vallecillo on 2019 9:44 CDT:  
CM NOTIFIED PT OF THE [facility] ACCEPTANCE, PT IN AGREEMENT WITH DISCHARGE TO THE 
[facility]. IMPORTANT MESSAGE FROM MEDICARE PROVIDED AND EXPLAINED.  
  
FOR DISCHARGE, FAX DISCHARGE INFORMATION TO THE [facility], [phone], 
NURSE REPORT TO BE CALLED TO THE [facility] -[phone]. [facility] TO 
ARRANGE VAN TRANSPORTATION.  
  
VANCE Yousif MANAGEMENT
DCP- Discharge Planning
 
Updated by [email or web address]: Migel Vallecillo on 19   8:10 am CT
Patient Name:  NEVILLE ESTEBAN   
Encounter No:  [id number]   
:  1940   
Primary Insurance:  MEDICARE PART A ONLY  
Anticipated DC Date: 2019   
Planned Disposition:  Skilled Nursing Facility  
External Planned Provider: THE [facility], MEDICARE REHAB BED  
  
  
DCP follow-up note: CM SPOKE TO LASHAWN, PT HAS BEEN ACCEPTED CLINICALLY FOR 
REHAB AT THE [facility] WITH TRANSITION TO LONG TERM CARE.  PT WILL HAVE TO 
PROVIDE FINANCIAL INFORMATION FOR MEDICAID APPLICATION AS PT IS NOT ELIGIBLE 
FOR [facility]EFITS.  CM FAXED UPDATE TO LASHAWN FOR THE [facility] AT 
[phone]. CM SPOKE TO PT IN ROOM, INFORMED PT THAT WITHOUT PARTICIPATION 
IN THERAPY SERVICES, PLACEMENT MAY NOT BE POSSIBLE.  PT PROMISED TO 
PARTICIPATE IN HOSPITAL THERAPY SERVICES.  
  
CM WAITING PT'S PARTICIPATION WITH PHYSICAL THERAPY EVALUATION AND THERAPY 
SERVICES AS WELL AS FINANCIAL ARRANGEMENTS FOR PLACEMENT AT THE [facility].  
  
Migel Vallecillo., CASE MANAGEMENT
DCP- Discharge Planning
 
Updated by [id number]: Migel Vallecillo on 19   2:05 pm CT
Patient Name: NEVILLE ESTEBAN                                     
Admission Status: ER   
Accout number: [id number]                              
Admission Date: 2019   
: 1940                                                        
Admission Diagnosis:UNSPECIFIED ABDOMINAL PAIN   
Attending: GARRISON ARMENTA                                                
Current LOS:  5   
  
Anticipated DC Date:    
Planned Disposition: Skilled Nursing Facility   
Primary Insurance: MEDICARE PART A ONLY   
PLANNED EXTERNAL PROVIDER :  THE [facility], MEDICARE REHAB BED 
  
Discharge Planning Comments:   
CM RECEIVED REQUEST FROM PT THROUGH BEDSIDE NURSE TO SPEAK TO PT REGARDING 
 
DISCHARGE PLANNING.  CM MET WITH PT IN ROOM TO DISCUSS DISCHARGE PLANNING AND 
NEEDS. PT REPORTS LIVING AT HOME INDEPENDENTLY AND ALONE.  PT [facility].  PT HAS HAD NO OUTSIDE SERVICES ASSISTING IN THE HOME. CM 
DISCUSSED AVAILABILITY OF HOME HEALTH, REHAB SERVICES AND MEDICAL EQUIPMENT. 
PT REPORTS HE IS NOT ABLE TO TAKE CARE OF HIMSELF ANY LONGER.  PT WANTS CM TO 
PLACE HIM SOMEWHERE FOR LONG TERM CARE.  PT REPORTS HE IS NOT ABLE TO GET OUT 
OF BED WITHOUT ASSISTANCE.  PT RECEIVES $1030 IN SOCIAL SECURITY AND $130 IN 
[employer] MONTHLY.  PT WOULD ONLY GO TO A [facility] HOME AS A LAST RESORT 
AND IS ONLY 10% SERVICE CONNECTED WITH DISABILITY.  PT DENIES HAVING ANY 
OTHER ASSETS.  PT LIN WITH [employer] IN [place].  PT REPORTS BEING PAID UP 
FOR THE REST OF THE MONTH'S RENT AT THE [facility] AUTHORITY 
[facility], APARTMENT 608.  CM DISCUSSED SKILLED NURSING FACILITY FOR REHAB, PT 
REPORTS WILLINGNESS FOR REHAB BUT STATES HE NEEDS LONG TERM CARE TOO.  
LISTING OF NURSING HOMES PROVIDED, PT SIGNED CONSENT FOR NO SKILLED NURSING 
FACILITY PREFERENCE.  IMPORTANT MESSAGE FROM MEDICARE PROVIDED AND EXPLAINED. 
  
CM NOTIFIED LASHAWN OF [facility] OF REHAB AND LONG TERM CARE REFERRAL, 
[phone].  CM FAXED REFERRAL TO LASHAWN OF [facility] -[phone].  
  
CM WAITING ADMISSION DETERMINATION FROM THE [facility] NURSING AND REH[facility] FOR REHAB 
AND LONG TERM CARE PLACEMENT.  
  
: Migel Vallecillo
 DCPIA - Discharge Planning Initial Assessment
 
Updated by [id number]: Migel Vallecillo on 19   2:58 pm
*  Is the patient Alert and Oriented?
Yes
*  How many steps to enter\exit or inside your home? ELEVATOR *  PCP DR. MEJIA - [facility]
*  Pharmacy
[facility] MAIL ORDER
 
*  Preadmission Environment
Home Alone
*  ADLs
Independent
*  Equipment
Walker
*  Other Equipment
[employer], MEDICAL EQUIPMENT PROVIDER
*  List name and contact numbers for known caregivers / representatives who 
currently or will assist patient after discharge:
RANULFO BLANCA, FRIEND, [phone]
*  Additional services required to return to the preadmission environment?
No
*  Can the patient safely return to the preadmission environment?
No
*  Has this patient been hospitalized within the prior 30 days at any 
hospital?
No
 
 
 
 
 
 
Coverage Notice
 
Reviewer: MAVERICK Vallecillo
 
Notice Issued Date-Time: 2019  13:30
Notice Type: IM Discharge Notice
 
Notice Delivered To: Patient
Relationship to Patient: 
Representative Name: 
 
Delivery Method: HAND - Hand Delivered
Emili Days:
Prior Verbal Notification: 
 
Recipient Understood Notice: Yes
Recipient Signature: Yes
Med Rec Note Co-signed by Attending:
 
Coverage Notice Comment:  
Reviewer: MAVERICK Vallecillo
 
Notice Issued Date-Time: 2019  13:30
Notice Type: Patient Choice Letter
 
Notice Delivered To: Patient
Relationship to Patient: 
Representative Name: 
 
Delivery Method: HAND - Hand Delivered
Emili Days:
Prior Verbal Notification: 
 
Recipient Understood Notice: Yes
Recipient Signature: Yes
Med Rec Note Co-signed by Attending:
 
Coverage Notice Comment:  NO SKILLED NURSING FACILITY PREFERENCE
Reviewer: MAVERICK Vallecillo
 
Notice Issued Date-Time: 2019   9:35
Notice Type: IM Discharge Notice
 
Notice Delivered To: Patient
Relationship to Patient: 
Representative Name: 
 
Delivery Method: HAND - Hand Delivered
 
Emili Days:
Prior Verbal Notification: 
 
Recipient Understood Notice: Yes
Recipient Signature: Yes
Med Rec Note Co-signed by Attending:
 
Coverage Notice Comment:  
Reviewer: [id number] Amanda Bello
 
Notice Issued Date-Time: 2019  13:26
Notice Type: IM Discharge Notice
 
Notice Delivered To: Patient
Relationship to Patient: 
Representative Name: 
 
Delivery Method: HAND - Hand Delivered
Emili Days:
Prior Verbal Notification: 
 
Recipient Understood Notice: 
Recipient Signature: 
Med Rec Note Co-signed by Attending:
 
Coverage Notice Comment:  
 
Last DP export: 19  12:11 p
Patient Name: NEVILLE ESTEBAN
Encounter #: [id number]
Page [id number]
 
 
 
 
 
Electronically Signed by ANA TOBAR on 19 at 1734
 
 
 
 
 
 
**All edits/amendments must be made on the electronic document**
 
DICTATION DATE: 19     : KERON  19     
RPT#: [id number]                                DC DATE:        
                                               STATUS: ADM IN  
[facility]
[address] [place] [address]
***END OF REPORT***

## 2019-08-23 NOTE — NUR
REPORT RECEIVED. AWAKE AND ALERT. RESP EVEN WITHOUT LABOR. O2 ON AT 3L/M
HIGHFLOW PER N/C. BED LOCKED AND IN LOWEST POSITION. DENIES ANY CURRENT NEEDS,
PO FLUIDS ENCOURAGED. CL IN REACH. CAREPLAN REVIEW DONE WITH SAFETY
PRECAUTIONS IN PLACE.

## 2019-08-23 NOTE — NUR
RECEIVED REPORT, WILL ASSUME CARE OF PT, ASSIST PT ON BEDPAIN, IV-RFA-PROTONIX
@ 10 INFUSING, 3L-O2, BED IS LOW, SRX2, CALL LIGHT IN REACH, WILL CONTINUE
PLAN OF CARE

## 2019-08-24 VITALS — SYSTOLIC BLOOD PRESSURE: 114 MMHG | DIASTOLIC BLOOD PRESSURE: 62 MMHG

## 2019-08-24 VITALS — DIASTOLIC BLOOD PRESSURE: 99 MMHG | SYSTOLIC BLOOD PRESSURE: 147 MMHG

## 2019-08-24 VITALS — SYSTOLIC BLOOD PRESSURE: 140 MMHG | DIASTOLIC BLOOD PRESSURE: 89 MMHG

## 2019-08-24 VITALS — DIASTOLIC BLOOD PRESSURE: 92 MMHG | SYSTOLIC BLOOD PRESSURE: 137 MMHG

## 2019-08-24 VITALS — DIASTOLIC BLOOD PRESSURE: 79 MMHG | SYSTOLIC BLOOD PRESSURE: 150 MMHG

## 2019-08-24 VITALS — SYSTOLIC BLOOD PRESSURE: 134 MMHG | DIASTOLIC BLOOD PRESSURE: 87 MMHG

## 2019-08-24 LAB
ALBUMIN SERPL-MCNC: 2.1 G/DL (ref 3.4–5)
ALP SERPL-CCNC: 89 U/L (ref 46–116)
ALT SERPL-CCNC: 10 U/L (ref 10–68)
ANION GAP SERPL CALC-SCNC: 4.6 MMOL/L (ref 8–16)
BASOPHILS NFR BLD AUTO: 0.1 % (ref 0–2)
BILIRUB SERPL-MCNC: 0.56 MG/DL (ref 0.2–1.3)
BUN SERPL-MCNC: 21 MG/DL (ref 7–18)
CALCIUM SERPL-MCNC: 8.2 MG/DL (ref 8.5–10.1)
CHLORIDE SERPL-SCNC: 102 MMOL/L (ref 98–107)
CO2 SERPL-SCNC: 38.5 MMOL/L (ref 21–32)
CREAT SERPL-MCNC: 0.8 MG/DL (ref 0.6–1.3)
EOSINOPHIL NFR BLD: 0.3 % (ref 0–7)
ERYTHROCYTE [DISTWIDTH] IN BLOOD BY AUTOMATED COUNT: 16.9 % (ref 11.5–14.5)
GLOBULIN SER-MCNC: 4.9 G/L
GLUCOSE SERPL-MCNC: 135 MG/DL (ref 74–106)
HCT VFR BLD CALC: 44.4 % (ref 42–54)
HCT VFR BLD CALC: 44.9 % (ref 42–54)
HCT VFR BLD CALC: 46.6 % (ref 42–54)
HGB BLD-MCNC: 13.5 G/DL (ref 13.5–17.5)
HGB BLD-MCNC: 13.9 G/DL (ref 13.5–17.5)
HGB BLD-MCNC: 14.4 G/DL (ref 13.5–17.5)
IMM GRANULOCYTES NFR BLD: 1.7 % (ref 0–5)
LYMPHOCYTES NFR BLD AUTO: 2.8 % (ref 15–50)
MAGNESIUM SERPL-MCNC: 1.9 MG/DL (ref 1.8–2.4)
MCH RBC QN AUTO: 26.4 PG (ref 26–34)
MCHC RBC AUTO-ENTMCNC: 30.4 G/DL (ref 31–37)
MCV RBC: 86.7 FL (ref 80–100)
MONOCYTES NFR BLD: 5.8 % (ref 2–11)
NEUTROPHILS NFR BLD AUTO: 89.3 % (ref 40–80)
OSMOLALITY SERPL CALC.SUM OF ELEC: 285 MOSM/KG (ref 275–300)
PLATELET # BLD: 241 10X3/UL (ref 130–400)
PMV BLD AUTO: 12.7 FL (ref 7.4–10.4)
POTASSIUM SERPL-SCNC: 4.1 MMOL/L (ref 3.5–5.1)
PROT SERPL-MCNC: 7 G/DL (ref 6.4–8.2)
RBC # BLD AUTO: 5.12 10X6/UL (ref 4.2–6.1)
SODIUM SERPL-SCNC: 141 MMOL/L (ref 136–145)
WBC # BLD AUTO: 19.6 10X3/UL (ref 4.8–10.8)

## 2019-08-24 NOTE — NUR
OFFERS NO C/O AT THIS TIME. CL IN REACH. RESP EVEN WITHOUT LABOR. NO FURTHER
EPISODES OF COUGHING UP MUCOUS NOTED.

## 2019-08-24 NOTE — NUR
REPORT RECEIVED. ALERT ABLE TO VOICE NEEDS BUT WILL REPEAT QUESTIONS AT TIMES.
IV SITE IS CLEAR WITH PROTONIX DRIP INFUSING. O2 ON AT 3 L/M HIGHFLOW. RESP
EVEN WITHOUT LABOR. CL IN REACH. BED IN LOWEST POSITION AND LOCKED. CAREPLAN
REVIEW DONE WITH SAFETY PRECAUTIONS IN PLACE. URINAL AT BEDSIDE WITH YELLOW
URINE PRESENT.

## 2019-08-24 NOTE — NUR
RECEIVED REPORT, WILL ASSUME CARE OF PT, PLACED PT ON BED PAN, DENIES ANY
OTHER NEEDS AT THIS TIME, IV-RFA-PROTONIX AT 10, BED IS LOW, SRX3, CALL LIGHT
IN REACH, WILL CONTINUE PLAN OF CARE

## 2019-08-24 NOTE — NUR
HE C/O FEELING LIKE SOMETHING WAS HUNG IN HIS THROAT. VSS O2 SAT IS 96%, HR 78
BBS ARE CLEAR. HE STARTED COUGHING UP SOME THICK MUCOUS. I SPOKE TO HIM ABOUT
HIS REFUSAL TO TAKE HIS MUCINEX AND BREATHING TREATMENTS AND HOW THIS COULD
HELP HIM THIN AND GET THAT MUCOUS UP. HE SAID I WILL DO WHAT I WANT AND I MAY
JUST GO TO THE ER. RESP THERAPY WILL DO BREATHING TX.

## 2019-08-24 NOTE — NUR
HE CALLED ME AND WANTED HIS MUCINEX DOSE HE HAD REFUSED THIS AM. HE HAD
COUGHED UP SOME THICK CLEAR MUCOUS AND NOW FEELS BETTER. HE HAS ALSO HAD A
BREATHING TREATMENT

## 2019-08-25 VITALS — SYSTOLIC BLOOD PRESSURE: 106 MMHG | DIASTOLIC BLOOD PRESSURE: 68 MMHG

## 2019-08-25 VITALS — DIASTOLIC BLOOD PRESSURE: 75 MMHG | SYSTOLIC BLOOD PRESSURE: 114 MMHG

## 2019-08-25 VITALS — DIASTOLIC BLOOD PRESSURE: 75 MMHG | SYSTOLIC BLOOD PRESSURE: 115 MMHG

## 2019-08-25 VITALS — SYSTOLIC BLOOD PRESSURE: 113 MMHG | DIASTOLIC BLOOD PRESSURE: 66 MMHG

## 2019-08-25 LAB
ALBUMIN SERPL-MCNC: 2.2 G/DL (ref 3.4–5)
ALP SERPL-CCNC: 87 U/L (ref 46–116)
ALT SERPL-CCNC: 7 U/L (ref 10–68)
ANION GAP SERPL CALC-SCNC: 4 MMOL/L (ref 8–16)
BASOPHILS NFR BLD AUTO: 5 % (ref 0–2)
BILIRUB SERPL-MCNC: 0.64 MG/DL (ref 0.2–1.3)
BUN SERPL-MCNC: 18 MG/DL (ref 7–18)
CALCIUM SERPL-MCNC: 8.5 MG/DL (ref 8.5–10.1)
CHLORIDE SERPL-SCNC: 102 MMOL/L (ref 98–107)
CO2 SERPL-SCNC: 39.3 MMOL/L (ref 21–32)
CREAT SERPL-MCNC: 0.6 MG/DL (ref 0.6–1.3)
EOSINOPHIL NFR BLD: 9 % (ref 0–7)
ERYTHROCYTE [DISTWIDTH] IN BLOOD BY AUTOMATED COUNT: 16.9 % (ref 11.5–14.5)
GLOBULIN SER-MCNC: 4.2 G/L
GLUCOSE SERPL-MCNC: 96 MG/DL (ref 74–106)
HCT VFR BLD CALC: 42.6 % (ref 42–54)
HCT VFR BLD CALC: 43 % (ref 42–54)
HCT VFR BLD CALC: 43.8 % (ref 42–54)
HGB BLD-MCNC: 12.9 G/DL (ref 13.5–17.5)
HGB BLD-MCNC: 12.9 G/DL (ref 13.5–17.5)
HGB BLD-MCNC: 13.4 G/DL (ref 13.5–17.5)
LYMPHOCYTES NFR BLD AUTO: 4 % (ref 15–50)
MAGNESIUM SERPL-MCNC: 2 MG/DL (ref 1.8–2.4)
MCH RBC QN AUTO: 26.1 PG (ref 26–34)
MCHC RBC AUTO-ENTMCNC: 30.3 G/DL (ref 31–37)
MCV RBC: 86.2 FL (ref 80–100)
MONOCYTES NFR BLD: 22 % (ref 2–11)
NEUTROPHILS NFR BLD AUTO: 56 % (ref 40–80)
OSMOLALITY SERPL CALC.SUM OF ELEC: 282 MOSM/KG (ref 275–300)
PLATELET # BLD EST: NORMAL 10*3/UL
PLATELET # BLD: 200 10X3/UL (ref 130–400)
PMV BLD AUTO: 12.6 FL (ref 7.4–10.4)
POTASSIUM SERPL-SCNC: 4.3 MMOL/L (ref 3.5–5.1)
PROT SERPL-MCNC: 6.4 G/DL (ref 6.4–8.2)
RBC # BLD AUTO: 4.94 10X6/UL (ref 4.2–6.1)
SODIUM SERPL-SCNC: 141 MMOL/L (ref 136–145)
WBC # BLD AUTO: 18.5 10X3/UL (ref 4.8–10.8)

## 2019-08-25 NOTE — NUR
UPON REVIEWING PROGRESS NOTES, THIS WRITERS NAME IS MENTIONED IN PROGRESS NOTE
THAT THE PATIENT WAS DISCUSSED AT GREAT LENGHT WITH THIS WRITER.  THIS WRITER
DID NOT HAVE PHONE NOR INDEPTH CONVERSATION IN PERSON WITH PHYSICIAN OR NURSE
PRACTITIONER REGARDING THIS PATIENT.

## 2019-08-25 NOTE — NUR
PATIENT WITH CALL LIGHT ON AGAIN. PATIENT STATES HE WANTS HIS THERMOSTAT
PLACED ON 97.8. THE HIGHEST THE THERMOSTAT WILL GO IS 90. PATIENT STATES "NO I
WANT IT ON AUTO 70" WHICH IS WHAT THE THERMOSTAT WAS SET ON WHEN ENTERING THE
ROOM.

## 2019-08-25 NOTE — NUR
RECEIVED REPORT. ASSUMED CARE OF PATIENT. RESTING WITH EYES CLOSED. IV
INFUSING AS ORDERED. RESP EVEN AND UNLABORED. NO DISTRESS. CALL LIGHT WITHIN
REACH.

## 2019-08-25 NOTE — NUR
ASSISTED TO SIT PATIENT UP IN BED. ORAL MEDICATIONS TAKEN AFTER EDUCATING
PATIENT WHAT MEDICATIONS ARE FOR. PATIENT SITTING UP EATING OATMEAL AT THIS
TIME. NO DISTRESS.

## 2019-08-25 NOTE — NUR
LINENS CHANGED, CALMOSEPTINE APPLIED TO BUTTOCKS AND FRAGILE SACRUM AREA.
PATIENT PULLED UP IN BED. ASSISTED WITH MEAL SETUP. CALL LIGHT WITHIN REACH.
NO DISTRESS.

## 2019-08-25 NOTE — NUR
PATIENT RESTING IN BED. ADJUSTED THERMOSTAT TO 72. LIGHT TURNED DOWN. CALL
LIGHT WITHIN REACH. NO DISTRESS.

## 2019-08-25 NOTE — NUR
RECEIVED REPORT, WILL ASSUME CARE OF PT, DENIES ANY NEEDS AT THIS TIME, BED IS
LOW, SRX3, CALL LIGHT IN REACH, WILL CONTINUE PLAN OF CARE

## 2019-08-25 NOTE — NUR
PATIETN WITH CALL LIGHT ON. PATIENT HEAD OF BED LOWERED AND LIGHTS TURNED OFF
PER PATIENT REQUEST. IV PROTONIX DRIP CONTINUES TO INFUSE AS ORDERED. NO
DISTRESS. CALL LIGHT WITHIN REACH.

## 2019-08-25 NOTE — NUR
ANSWERED PATIENTS CALL LIGHT, PATIENT STATES HE DIDN'T KNOW HE HAD TURNED IT
ON. LIGHT CANCELLED. NOW 10 MINUTES LATER, PATIENTS CALL LIGHT CAME ON AT THIS
TIME. CALL LIGHT ANSWERED AND PATIENT AGAIN STATES THAT HE DID NOT KNOW THAT
HE TURNED IT ON. PATIENT REFUSES TO MOVE CALL LIGHT TO PREVENT ACCIDENTAL
TURNING CALL LIGHT ON.

## 2019-08-26 VITALS — SYSTOLIC BLOOD PRESSURE: 116 MMHG | DIASTOLIC BLOOD PRESSURE: 68 MMHG

## 2019-08-26 VITALS — DIASTOLIC BLOOD PRESSURE: 73 MMHG | SYSTOLIC BLOOD PRESSURE: 119 MMHG

## 2019-08-26 VITALS — SYSTOLIC BLOOD PRESSURE: 121 MMHG | DIASTOLIC BLOOD PRESSURE: 72 MMHG

## 2019-08-26 LAB
ALBUMIN SERPL-MCNC: 2.2 G/DL (ref 3.4–5)
ALP SERPL-CCNC: 85 U/L (ref 46–116)
ALT SERPL-CCNC: 7 U/L (ref 10–68)
ANION GAP SERPL CALC-SCNC: 3.4 MMOL/L (ref 8–16)
BILIRUB SERPL-MCNC: 0.75 MG/DL (ref 0.2–1.3)
BUN SERPL-MCNC: 16 MG/DL (ref 7–18)
CALCIUM SERPL-MCNC: 8.8 MG/DL (ref 8.5–10.1)
CHLORIDE SERPL-SCNC: 101 MMOL/L (ref 98–107)
CO2 SERPL-SCNC: 40.4 MMOL/L (ref 21–32)
CREAT SERPL-MCNC: 0.7 MG/DL (ref 0.6–1.3)
EOSINOPHIL NFR BLD: 7 % (ref 0–7)
ERYTHROCYTE [DISTWIDTH] IN BLOOD BY AUTOMATED COUNT: 16.9 % (ref 11.5–14.5)
GLOBULIN SER-MCNC: 4.5 G/L
GLUCOSE SERPL-MCNC: 88 MG/DL (ref 74–106)
HCT VFR BLD CALC: 43.4 % (ref 42–54)
HGB BLD-MCNC: 13.2 G/DL (ref 13.5–17.5)
IMM GRANULOCYTES NFR BLD: 1 % (ref 0–5)
LYMPHOCYTES NFR BLD AUTO: 3 % (ref 15–50)
MAGNESIUM SERPL-MCNC: 2 MG/DL (ref 1.8–2.4)
MCH RBC QN AUTO: 26 PG (ref 26–34)
MCHC RBC AUTO-ENTMCNC: 30.4 G/DL (ref 31–37)
MCV RBC: 85.4 FL (ref 80–100)
MONOCYTES NFR BLD: 17 % (ref 2–11)
NEUTROPHILS NFR BLD AUTO: 70 % (ref 40–80)
OSMOLALITY SERPL CALC.SUM OF ELEC: 280 MOSM/KG (ref 275–300)
PLATELET # BLD: 231 10X3/UL (ref 130–400)
POTASSIUM SERPL-SCNC: 3.8 MMOL/L (ref 3.5–5.1)
PROT SERPL-MCNC: 6.7 G/DL (ref 6.4–8.2)
RBC # BLD AUTO: 5.08 10X6/UL (ref 4.2–6.1)
SODIUM SERPL-SCNC: 141 MMOL/L (ref 136–145)
WBC # BLD AUTO: 16.3 10X3/UL (ref 4.8–10.8)

## 2019-08-26 NOTE — [facility]
CASE MANAGEMENT DISCHARGE SUMMARY
 
 
PATIENT: NEVILLE ESTEBAN                        UNIT: [id number]
ACCOUNT#: [id number]                       ADM DATE: 19
AGE: 79     : 40  SEX: M            ROOM/BED: D.2132    
AUTHOR: AMADOU,DOC                             PHYSICIAN:                               
 
REFERRING PHYSICIAN: GARRISON ARMENTA MD               
DATE OF SERVICE: 19
Discharge Plan
 
 
Patient Name: NEVILLE ESTEBAN
Facility: [facility]
Encounter #: [id number]
Medical Record #: [id number]
: 1940
Planned Disposition: Skilled Nursing Facility
Anticipated Discharge Date: 19
 
Discharge Date: 
Expected LOS: 25
Initial Reviewer: [id number]
Initial Review Date: 2019
Generated: 19  10:27 am 
Comments
 
DCP- Discharge Planning
 
Updated by [id number]: Migel Vallecillo on 19   8:24 am CT
Patient Name:  NEVILLE ESTEBAN   
Encounter No:  [id number]   
:  1940   
Primary Insurance:  MEDICARE PART A ONLY  
Anticipated DC Date: 2019   
Planned Disposition:  Skilled Nursing Facility  
External Planned Provider:[facility], MEDICARE REHAB 
BED  
  
  
DCP follow-up note: CM FAXED HOSPITAL UPDATE TO [facility] AT 
[phone].  CM REQUESTED CONFIRMATION FROM [place] THAT SCD'S CAN BE 
PROVIDED BY THE [facility].  CM SPOKE TO PT IN ROOM WHO IS STILL IN 
AGREEMENT WITH DISCHARGE TO THE [facility].  IMPORTANT MESSAGE FROM MEDICARE 
PROVIDED AND EXPLAINED.  
  
FOR DISCHARGE, FAX DISCHARGE INFORMATION TO THE [facility], [phone], 
NURSE REPORT TO BE CALLED TO THE [facility] [phone]. [facility] TO 
ARRANGE VAN TRANSPORTATION.  
 
  
Migel Vallecillo, CASE MANAGEMENT
DCP- Discharge Planning
 
Updated by [id number]: Migel Vallecillo on 19   4:31 pm CT
Patient Name:  NEVILLE ESTEBAN   
Encounter No:  [id number]   
:  1940   
Primary Insurance:  MEDICARE PART A ONLY  
Anticipated DC Date: 2019   
Planned Disposition:  Skilled Nursing Facility  
External Planned Provider: [facility], MEDICARE REHAB 
BED  
  
  
DCP follow-up note: CM FAXED HOSPITAL UPDATE TO [facility] AT 
[phone].   DOMINIK NOTIFIED LASHAWN THAT PT WILL REQUIRE SCD'S AND [facility] AS 
WELL AS LONG TERM STEROID THERAPY WHILE IN [facility].  LASHAWN WILL ENSURE 
THAT SCD'S CAN BE PROVIDED BY THE [facility].  
  
FOR DISCHARGE, FAX DISCHARGE INFORMATION TO THE [facility], [phone], 
NURSE REPORT TO BE CALLED TO THE [facility] [phone]. [facility] TO 
ARRANGE VAN TRANSPORTATION.  
  
Migel Vallecillo, CASE MANAGEMENT
DCP- Discharge Planning
 
Updated by [id number]: Migel Vallecillo on 19  12:08 pm CT
Patient Name:  NEVILLE ESTEBAN   
Encounter No:  [id number]   
:  1940   
Primary Insurance:  MEDICARE PART A ONLY  
Anticipated DC Date: 2019   
Planned Disposition:  Skilled Nursing Facility  
External Planned [facility] 
[facility]ED  
  
  
DCP follow-up note: CM FAXED HOSPITAL UPDATE TO [place] [facility] AT 
[phone].   
  
FOR DISCHARGE, FAX DISCHARGE INFORMATION TO THE [facility], [phone], 
NURSE REPORT TO BE CALLED TO THE [facility] [phone]. [facility] TO 
ARRANGE VAN TRANSPORTATION.  
  
Migel Vallecillo CASE MANAGEMENT
DCP- Discharge Planning
 
Updated by [id number]: Bailey Bello on 19   2:37 pm CT
Order received for patient to dc to nursing facility. Nursing requesting that 
CM speak to patient to see if the nursing home could accept  today.  Reviewed 
chart, noted the [facility] has accepted back on the .  Spoke to Lashawn with 
 
the [facility] to see if the patient could be admitted today.  Lashawn said patient 
could come today and they would pick him up at 1400.  Cm called Dr. Armenta, 
informed him the patient could dc today. Dr. Armenta informed cm that the 
patient was not medically stable for dc today because he has had 3 bloody 
diarrhea stools and that he has consulted GI.  CM therefore informed him once 
the patient is medically stable that the patient had acceptance and no 
further consult was needed just a dc order.  CM faxed update to Lashawn at her 
request. Cm also notified Lashawn that the patient was not medically stable 
for dc at this time.   
  
Bailey Bello RN, [facility]
DCP- Discharge Planning
 
Updated by [id number]: Migel Vallecillo on 8/15/19   7:00 am CT
Patient Name:  NEVILLE ESTEBAN   
Encounter No:  [id number]   
:  1940   
Primary Insurance:  MEDICARE PART A ONLY  
Anticipated DC Date: 2019   
Planned Disposition:  Skilled Nursing Facility  
External Planned Provider: [facility] MED[facility]ARE REHAB 
BED  
  
  
DCP follow-up note: CM FAXED HOSPITAL UPDATE TO [facility] AT 
[phone].   
  
FOR DISCHARGE, FAX DISCHARGE INFORMATION TO THE [facility], [phone], 
NURSE REPORT TO BE CALLED TO THE [facility] [phone]. [facility] TO 
ARRANGE VAN TRANSPORTATION.  
  
Migel Vallecillo, CASE MANAGEMENT
DCP- Discharge Planning
 
Updated by [id number]: Migel Vallecillo on 19   8:57 am CT
Patient Name:  NEVILLE ESTEBAN   
Encounter No:  [id number]   
:  1940   
Primary Insurance:  MEDICARE PART A ONLY  
Anticipated DC Date: 2019   
Planned Disposition:  Skilled Nursing Facility  
External Planned Provider:[facility] MEDICARE REHAB 
BED  
  
  
DCP follow-up note: CM FAXED HOSPITAL UPDATE TO [facility] AT 
[phone].   
  
FOR DISCHARGE, FAX DISCHARGE INFORMATION TO THE [facility], [phone], 
NURSE REPORT TO BE CALLED TO THE [facility] [phone]. [facility] TO 
ARRANGE VAN TRANSPORTATION.  
  
 
Migel Vallecillo CASE MANAGEMENT
DCP- Discharge Planning
 
Updated by [id number]: Migel Vallecillo on 19   8:44 am CT
Patient Name:  NEVILLE ESTEBAN   
Encounter No:  [id number]   
:  1940   
Primary Insurance:  MEDICARE PART A ONLY  
Anticipated DC Date: 2019   
Planned Disposition:  Skilled Nursing Facility  
External Planned Provider: [facility] MEDICARE REHAB 
BED  
  
  
DCP follow-up note: ON 19 CM SPOKE TO [facility], [phone], 
PT HAS BEEN ACCEPTED.  [facility] NOTIFIED, CM ADVISED PT HAD FEVER AND 
ELEVATED WBC'S, NOT READY TO DC 19.  CM NOTIFIED LASHAWN OF THE [facility].  
  
FOR DISCHARGE, FAX DISCHARGE INFORMATION TO THE [facility], [phone], 
NURSE REPORT TO BE CALLED TO THE [facility] [phone]. [facility] TO 
ARRANGE VAN TRANSPORTATION.  
  
Migel Vallecillo, CASE MANAGEMENT  
  
Appended by Migel Vallecillo on 2019 9:44 CDT:  
CM NOTIFIED PT OF THE [facility] ACCEPTANCE, PT IN AGREEMENT WITH DISCHARGE TO THE 
[facility]. IMPORTANT MESSAGE FROM MEDICARE PROVIDED AND EXPLAINED.  
  
FOR DISCHARGE, FAX DISCHARGE INFORMATION TO THE [facility], [phone], 
NURSE REPORT TO BE CALLED TO THE [facility] [phone]. [facility] TO 
ARRANGE VAN TRANSPORTATION.  
  
Migel Vallecillo CASE MANAGEMENT
DCP- Discharge Planning
 
Updated by [id number]: Migel Vallecillo on 19   8:10 am CT
Patient Name:  NEVILLE ESTEBAN   
Encounter No:  [id number]   
:  1940   
Primary Insurance:  MEDICARE PART A ONLY  
Anticipated DC Date: 2019   
Planned Disposition:  Skilled Nursing Facility  
External Planned Provider: [facility] N[facility], MEDICARE REHAB BED  
  
  
DCP follow-up note: CM SPOKE TO LASHAWN, PT HAS BEEN ACCEPTED CLINICALLY FOR 
REHAB AT THE [facility] WITH TRANSITION TO LONG TERM CARE.  PT WILL HAVE TO 
PROVIDE FINANCIAL INFORMATION FOR MEDICAID APPLICATION AS PT IS NOT ELIGIBLE 
FOR [facility] BENEFITS.  CM FAXED UPDATE TO [place] [facility] AT 
[phone]. CM SPOKE TO PT IN ROOM, INFORMED PT THAT WITHOUT PARTICIPATION 
IN THERAPY SERVICES, PLACEMENT MAY NOT BE POSSIBLE.  PT PROMISED TO 
PARTICIPATE IN HOSPITAL THERAPY SERVICES.  
 
  
CM WAITING PT'S PARTICIPATION WITH PHYSICAL THERAPY EVALUATION AND THERAPY 
SERVICES AS WELL AS FINANCIAL ARRANGEMENTS FOR PLACEMENT AT THE [facility].  
  
Migel Vallecillo., CASE MANAGEMENT
DCP- Discharge Planning
 
Updated by [id number]: Migel Vallecillo on 19   2:05 pm CT
Patient Name: NEVILLE ESTEBAN                                     
Admission Status: ER   
Accout number: [id number]                              
Admission Date: 2019   
: 1940                                                        
Admission Diagnosis:UNSPECIFIED ABDOMINAL PAIN   
Attending: GARRISON ARMENTA                                                
Current LOS:  5   
  
Anticipated DC Date:    
Planned Disposition: Skilled Nursing Facility   
Primary Insurance: MEDICARE PART A ONLY   
PLANNED EXTERNAL PROVIDER :  [facility]SING [facility], MEDICARE REHAB BED 
  
Discharge Planning Comments:   
CM RECEIVED REQUEST FROM PT THROUGH BEDSIDE NURSE TO SPEAK TO PT REGARDING 
DISCHARGE PLANNING.  CM MET WITH PT IN ROOM TO DISCUSS DISCHARGE PLANNING AND 
NEEDS. PT REPORTS LIVING AT HOME INDEPENDENTLY AND ALONE.  [facility].  PT HAS HAD NO OUTSIDE SERVICES ASSISTING IN THE HOME. CM 
DISCUSSED AVAILABILITY OF HOME HEALTH, REHAB SERVICES AND MEDICAL EQUIPMENT. 
PT REPORTS HE IS NOT ABLE TO TAKE CARE OF HIMSELF ANY LONGER.  PT WANTS CM TO 
PLACE HIM SOMEWHERE FOR LONG TERM CARE.  PT REPORTS HE IS NOT ABLE TO GET OUT 
OF BED WITHOUT ASSISTANCE.  PT RECEIVES $1030 IN SOCIAL SECURITY AND $130 IN 
[employer] MONTHLY.  PT WOULD ONLY GO TO A [facility] HOME AS A LAST RESORT 
AND IS ONLY 10% SERVICE CONNECTED WITH DISABILITY.  PT DENIES HAVING ANY 
OTHER ASSETS.  PT LIN WITH [employer] IN [place].  PT REPORTS BEING PAID UP 
FOR THE REST OF THE MONTH'S RENT AT THE [facility] AUTHORITY 
[facility], APARTMENT [address].  CM DISCUSSED SKILLED NURSING FACILITY FOR REHAB, PT 
REPORTS WILLINGNESS FOR REHAB BUT STATES HE NEEDS LONG TERM CARE TOO.  
LISTING OF NURSING HOMES PROVIDED, PT SIGNED CONSENT FOR NO SKILLED NURSING 
FACILITY PREFERENCE.  IMPORTANT MESSAGE FROM MEDICARE PROVIDED AND EXPLAINED. 
  
CM NOTIFIED LASHAWN OF [facility] OF REHAB AND LONG TERM CARE REFERRAL, 
[phone].  CM FAXED REFERRAL TO LASHAWN OF THE [facility] [phone].  
  
CM WAITING ADMISSION DETERMINATION FROM THE [facility] NURSING AND REHAB FOR REHAB 
AND LONG TERM CARE PLACEMENT.  
  
: Migel Vallecillo
 DCPIA - Discharge Planning Initial Assessment
 
Updated by [id number]: Migel Vallecillo on 19   2:58 pm
*  Is the patient Alert and Oriented?
Yes
 
*  How many steps to enter\exit or inside your home? ELEVATOR *  PCP DR. MEJIA - [facility]
*  Pharmacy
[facility] MAIL ORDER
 
*  Preadmission Environment
Home Alone
*  ADLs
Independent
*  Equipment
Walker
*  Other Equipment
[employer], MEDICAL EQUIPMENT PROVIDER
*  List name and contact numbers for known caregivers / representatives who 
currently or will assist patient after discharge:
RANULFO BLANCA, FRIEND, [phone]
*  Additional services required to return to the preadmission environment?
No
*  Can the patient safely return to the preadmission environment?
No
*  Has this patient been hospitalized within the prior 30 days at any 
hospital?
No
 
 
 
 
 
Coverage Notice
 
Reviewer: MAVERICK Vallecillo
 
Notice Issued Date-Time: 2019  13:30
Notice Type: IM Discharge Notice
 
Notice Delivered To: Patient
Relationship to Patient: 
Representative Name: 
 
Delivery Method: HAND - Hand Delivered
Emili Days:
Prior Verbal Notification: 
 
Recipient Understood Notice: Yes
Recipient Signature: Yes
Med Rec Note Co-signed by Attending:
 
Coverage Notice Comment:  
Reviewer: MAVERICK Vallecillo
 
Notice Issued Date-Time: 2019  13:30
Notice Type: Patient Choice Letter
 
 
Notice Delivered To: Patient
Relationship to Patient: 
Representative Name: 
 
Delivery Method: HAND - Hand Delivered
Emili Days:
Prior Verbal Notification: 
 
Recipient Understood Notice: Yes
Recipient Signature: Yes
Med Rec Note Co-signed by Attending:
 
Coverage Notice Comment:  NO SKILLED NURSING FACILITY PREFERENCE
Reviewer: MAVERICK Vallecillo
 
Notice Issued Date-Time: 2019   9:35
Notice Type: IM Discharge Notice
 
Notice Delivered To: Patient
Relationship to Patient: 
Representative Name: 
 
Delivery Method: HAND - Hand Delivered
Emili Days:
Prior Verbal Notification: 
 
Recipient Understood Notice: Yes
Recipient Signature: Yes
Med Rec Note Co-signed by Attending:
 
Coverage Notice Comment:  
Reviewer: [id number] Amanda Bello
 
Notice Issued Date-Time: 2019  13:26
Notice Type: IM Discharge Notice
 
Notice Delivered To: Patient
Relationship to Patient: 
Representative Name: 
 
Delivery Method: HAND - Hand Delivered
Emili Days:
Prior Verbal Notification: 
 
Recipient Understood Notice: 
Recipient Signature: 
Med Rec Note Co-signed by Attending:
 
Coverage Notice Comment:  
Reviewer: MAVERICK Vallecillo
 
 
Notice Issued Date-Time: 2019   9:10
Notice Type: IM Discharge Notice
 
Notice Delivered To: Patient
Relationship to Patient: 
Representative Name: 
 
Delivery Method: HAND - Hand Delivered
Emili Days:
Prior Verbal Notification: 
 
Recipient Understood Notice: Yes
Recipient Signature: Yes
Med Rec Note Co-signed by Attending:
 
Coverage Notice Comment:  
 
Last DP export: 19   4:34 p
Patient Name: NEVILLE ESTEBAN
Encounter #: [id number]
Page [id number]
 
 
 
 
 
Electronically Signed by ANA TOBAR on 19 at 0927
 
 
 
 
 
 
**All edits/amendments must be made on the electronic document**
 
DICTATION DATE: 19     : KERON  19     
RPT#: [id number]                                DC DATE:        
                                               STATUS: ADM IN  
[facility]
 [place] [address]
***END OF REPORT***

## 2019-08-26 NOTE — MORECARE
CASE MANAGEMENT DISCHARGE SUMMARY
 
 
PATIENT: NEVILLE ESTEBAN                        UNIT: [id number]
ACCOUNT#: [id number]                       ADM DATE: 19
AGE: 79     : 40  SEX: M            ROOM/BED: D.2132    
AUTHOR: AMADOU,DOC                             PHYSICIAN:                               
 
REFERRING PHYSICIAN: GARRISON ARMENTA MD               
DATE OF SERVICE: 19
Discharge Plan
 
 
Patient Name: NEVILLE ESTEBAN
Facility: [facility]
Encounter #: [id number]
Medical Record #: [id number]
: 1940
Planned Disposition: Skilled Nursing Facility
Anticipated Discharge Date: 19
 
Discharge Date: 
Expected LOS: 18
Initial Reviewer: [id number]
Initial Review Date: 2019
Generated: 19   3:09 pm 
Comments
 
DCP- Discharge Planning
 
Updated by [email or web address]: Migel Vallecillo on 19   8:24 am CT
Patient Name:  NEVILLE ESTEBAN   
Encounter No:  [id number]   
:  1940   
Primary Insurance:  MEDICARE PART A ONLY  
Anticipated DC Date: 2019   
Planned Disposition:  Skilled Nursing Facility  
External Planned Provider:[facility], MEDICARE REHAB 
BED  
  
  
DCP follow-up note: CM FAXED HOSPITAL UPDATE TO [facility] AT 
[phone].  CM REQUESTED CONFIRMATION FROM [facility] THAT SCD'S CAN BE 
PROVIDED BY THE [facility].  CM SPOKE TO PT IN ROOM WHO IS STILL IN 
AGREEMENT WITH DISCHARGE TO THE [facility].  IMPORTANT MESSAGE FROM MEDICARE 
PROVIDED AND EXPLAINED.  
  
FOR DISCHARGE, FAX DISCHARGE INFORMATION TO THE [facility], [phone], 
NURSE REPORT TO BE CALLED TO THE [facility] [phone]. [facility] TO 
ARRANGE VAN TRANSPORTATION.  
 
  
Migel Vallecillo, CASE MANAGEMENT
DCP- Discharge Planning
 
Updated by [email or web address]: Migel Vallecillo on 19   4:31 pm CT
Patient Name:  NEVILLE ESTEBAN   
Encounter No:  [id number]   
:  1940   
Primary Insurance:  MEDICARE PART A ONLY  
Anticipated DC Date: 2019   
Planned Disposition:  Skilled Nursing Facility  
External Planned Provider: [facility] MEDICARE REHAB 
BED  
  
  
DCP follow-up note: CM FAXED HOSPITAL UPDATE TO [facility] AT 
[phone].   DOMINIK NOTIFIED LASHAWN THAT PT WILL REQUIRE SCD'S AND MARIA G HOSE AS 
WELL AS LONG TERM STEROID THERAPY WHILE IN [facility].  LASHAWN WILL ENSURE 
THAT SCD'S CAN BE PROVIDED BY THE [facility].  
  
FOR DISCHARGE, FAX DISCHARGE INFORMATION TO THE [facility], [phone], 
NURSE REPORT TO BE CALLED TO THE [facility] [phone]. [facility] TO 
ARRANGE VAN TRANSPORTATION.  
  
Migel Vallecillo, CASE MANAGEMENT
DCP- Discharge Planning
 
Updated by [email or web address]: Migel Vallecillo on 19  12:08 pm CT
Patient Name:  NEVILLE ESTEBAN   
Encounter No:  [id number]   
:  1940   
Primary Insurance:  MEDICARE PART A ONLY  
Anticipated DC Date: 2019   
Planned Disposition:  Skilled Nursing Facility  
External Planned Provider[facility] 
BED  
  
  
DCP follow-up note: CM FAXED HOSPITAL UPDATE TO [facility] AT 
[phone].   
  
FOR DISCHARGE, FAX DISCHARGE INFORMATION TO THE [facility], [phone], 
NURSE REPORT TO BE CALLED TO THE [facility] [phone]. [facility] TO 
ARRANGE VAN TRANSPORTATION.  
  
Migel Vallecillo CASE MANAGEMENT
DCP- Discharge Planning
 
Updated by [id number]: Bailey Bello on 19   2:37 pm CT
Order received for patient to dc to nursing facility. Nursing requesting that 
CM speak to patient to see if the nursing home could accept  today.  Reviewed 
chart, noted the [facility] has accepted back on the .  Spoke to Lashawn with 
 
the [facility] to see if the patient could be admitted today.  Lashawn said patient 
could come today and they would pick him up at 1400.  Cm called Dr. Armenta, 
informed him the patient could dc today. Dr. Armenta informed cm that the 
patient was not medically stable for dc today because he has had 3 bloody 
diarrhea stools and that he has consulted GI.  CM therefore informed him once 
the patient is medically stable that the patient had acceptance and no 
further consult was needed just a dc order.  CM faxed update to Lashawn at her 
request. Cm also notified Lashawn that the patient was not medically stable 
for dc at this time.   
  
Bailey Bello RN, [facility]
DCP- Discharge Planning
 
Updated by [email or web address]: Migel Vallecillo on 8/15/19   7:00 am CT
Patient Name:  NEVILLE ESTEBAN   
Encounter No:  [id number]   
:  1940   
Primary Insurance:  MEDICARE PART A ONLY  
Anticipated DC Date: 2019   
Planned Disposition:  Skilled Nursing Facility  
External Planned Provider: [facility] MEDICARE REHAB 
BED  
  
  
DCP follow-up note: CM FAXED HOSPITAL UPDATE TO [facility] AT 
[phone].   
  
FOR DISCHARGE, FAX DISCHARGE INFORMATION TO THE [facility], [phone], 
NURSE REPORT TO BE CALLED TO THE [facility] [phone]. [facility] TO 
ARRANGE VAN TRANSPORTATION.  
  
Migel Vallecillo, CASE MANAGEMENT
DCP- Discharge Planning
 
Updated by [email or web address]: Migel Vallecillo on 19   8:57 am CT
Patient Name:  NEVILLE ESTEBAN   
Encounter No:  [id number]   
:  1940   
Primary Insurance:  MEDICARE PART A ONLY  
Anticipated DC Date: 2019   
Planned Disposition:  Skilled Nursing Facility  
External Planned Provider:[facility] MEDICARE REHAB 
BED  
  
  
DCP follow-up note: CM FAXED HOSPITAL UPDATE TO [facility] AT 
[phone].   
  
FOR DISCHARGE, FAX DISCHARGE INFORMATION TO THE [facility], [phone], 
NURSE REPORT TO BE CALLED TO THE [facility] [phone]. [facility] TO 
ARRANGE VAN TRANSPORTATION.  
  
 
Migel Vallecillo CASE MANAGEMENT
DCP- Discharge Planning
 
Updated by [email or web address]: Migel Vallecillo on 19   8:44 am CT
Patient Name:  NEVILLE ESTEBAN   
Encounter No:  [id number]   
:  1940   
Primary Insurance:  MEDICARE PART A ONLY  
Anticipated DC Date: 2019   
Planned Disposition:  Skilled Nursing Facility  
External Planned Provider: [facility] MEDICARE REHAB 
BED  
  
  
DCP follow-up note: ON 19 CM SPOKE TO [facility], [phone], 
PT HAS BEEN ACCEPTED.  NEEL FLOWERS NOTIFIED, CM ADVISED PT HAD FEVER AND 
ELEVATED WBC'S, NOT READY TO DC 19.  CM NOTIFIED LASHAWN OF THE [facility].  
  
FOR DISCHARGE, FAX DISCHARGE INFORMATION TO THE [facility], [phone], 
NURSE REPORT TO BE CALLED TO THE [facility] [phone]. [facility] TO 
ARRANGE VAN TRANSPORTATION.  
  
Migel Vallecillo, CASE MANAGEMENT  
  
Appended by Migel Vallecillo on 2019 9:44 CDT:  
CM NOTIFIED PT OF THE [facility] ACCEPTANCE, PT IN AGREEMENT WITH DISCHARGE TO THE 
[facility]. IMPORTANT MESSAGE FROM MEDICARE PROVIDED AND EXPLAINED.  
  
FOR DISCHARGE, FAX DISCHARGE INFORMATION TO THE [facility], [phone], 
NURSE REPORT TO BE CALLED TO THE [facility] [phone]. [facility] TO 
ARRANGE VAN TRANSPORTATION.  
  
Migel Vallecillo CASE MANAGEMENT
DCP- Discharge Planning
 
Updated by [email or web address]: Migel Vallecillo on 19   8:10 am CT
Patient Name:  NEVILLE ESTEBAN   
Encounter No:  [id number]   
:  1940   
Primary Insurance:  MEDICARE PART A ONLY  
Anticipated DC Date: 2019   
Planned Disposition:  Skilled Nursing Facility  
External Planned Provider: [facility], MEDICARE REHAB BED  
  
  
DCP follow-up note: CM SPOKE TO LASHAWN, PT HAS BEEN ACCEPTED CLINICALLY FOR 
REHAB AT THE [facility] WITH TRANSITION TO LONG TERM CARE.  PT WILL HAVE TO 
PROVIDE FINANCIAL INFORMATION FOR MEDICAID APPLICATION AS PT IS NOT ELIGIBLE 
FOR [facility] BENEFITS.  CM FAXED UPDATE TO [facility] AT 
[phone]. CM SPOKE TO PT IN ROOM, INFORMED PT THAT WITHOUT PARTICIPATION 
IN THERAPY SERVICES, PLACEMENT MAY NOT BE POSSIBLE.  PT PROMISED TO 
PARTICIPATE IN HOSPITAL THERAPY SERVICES.  
 
  
CM WAITING PT'S PARTICIPATION WITH PHYSICAL THERAPY EVALUATION AND THERAPY 
SERVICES AS WELL AS FINANCIAL ARRANGEMENTS FOR PLACEMENT AT THE [facility].  
  
Migel Vallecillo., CASE MANAGEMENT
DCP- Discharge Planning
 
Updated by [email or web address]: Migel Vallecillo on 19   2:05 pm CT
Patient Name: NEVILLE ESTEBAN                                     
Admission Status: ER   
Accout number: [id number]                              
Admission Date: 2019   
: 1940                                                        
Admission Diagnosis:UNSPECIFIED ABDOMINAL PAIN   
Attending: GARRISON ARMENTA                                                
Current LOS:  5   
  
Anticipated DC Date:    
Planned Disposition: Skilled Nursing Facility   
Primary Insurance: MEDICARE PART A ONLY   
PLANNED EXTERNAL PROVIDER :  [facility], MEDICARE REHAB BED 
  
Discharge Planning Comments:   
CM RECEIVED REQUEST FROM PT THROUGH BEDSIDE NURSE TO SPEAK TO PT REGARDING 
DISCHARGE PLANNING.  CM MET WITH PT IN ROOM TO DISCUSS DISCHARGE PLANNING AND 
NEEDS. PT REPORTS LIVING AT HOME INDEPENDENTLY AND ALONE.  [facility].  PT HAS HAD NO OUTSIDE SERVICES ASSISTING IN THE HOME. CM 
DISCUSSED AVAILABILITY OF HOME HEALTH, REHAB SERVICES AND MEDICAL EQUIPMENT. 
PT REPORTS HE IS NOT ABLE TO TAKE CARE OF HIMSELF ANY LONGER.  PT WANTS CM TO 
PLACE HIM SOMEWHERE FOR LONG TERM CARE.  PT REPORTS HE IS NOT ABLE TO GET OUT 
OF BED WITHOUT ASSISTANCE.  PT RECEIVES $1030 IN SOCIAL SECURITY AND $130 IN 
[employer] MONTHLY.  PT WOULD ONLY GO TO A [facility] HOME AS A LAST RESORT 
AND IS ONLY 10% SERVICE CONNECTED WITH DISABILITY.  PT DENIES HAVING ANY 
OTHER ASSETS.  PT LIN WITH [employer] IN [place].  PT REPORTS BEING PAID UP 
FOR THE REST OF THE MONTH'S RENT AT THE [facility] AUTHORITY 
[facility], APARTMENT [address].  CM DISCUSSED SKILLED NURSING FACILITY FOR REHAB, PT 
REPORTS WILLINGNESS FOR REHAB BUT STATES HE NEEDS LONG TERM CARE TOO.  
LISTING OF NURSING HOMES PROVIDED, PT SIGNED CONSENT FOR NO SKILLED NURSING 
FACILITY PREFERENCE.  IMPORTANT MESSAGE FROM MEDICARE PROVIDED AND EXPLAINED. 
  
CM NOTIFIED LASHAWN OF [facility] OF REHAB AND LONG TERM CARE REFERRAL, 
[phone].  CM FAXED REFERRAL TO LASHAWN OF THE [facility] [phone].  
  
CM WAITING ADMISSION DETERMINATION FROM THE [facility] FOR REHAB 
AND LONG TERM CARE PLACEMENT.  
  
: Migel Vallecillo
 DCPIA - Discharge Planning Initial Assessment
 
Updated by [email or web address]: Migel Vallecillo on 19   2:58 pm
*  Is the patient Alert and Oriented?
Yes
 
*  How many steps to enter\exit or inside your home? ELEVATOR *  PCP DR. MEJIA - [facility]
*  Pharmacy
[facility] MAIL ORDER
 
*  Preadmission Environment
Home Alone
*  ADLs
Independent
*  Equipment
Walker
*  Other Equipment
[employer], MEDICAL EQUIPMENT PROVIDER
*  List name and contact numbers for known caregivers / representatives who 
currently or will assist patient after discharge:
RANULFO BLANCA, FRIEND, [phone]
*  Additional services required to return to the preadmission environment?
No
*  Can the patient safely return to the preadmission environment?
No
*  Has this patient been hospitalized within the prior 30 days at any 
hospital?
No
 
 
 
 
 
Coverage Notice
 
Reviewer: MAVERICK Vallecillo
 
Notice Issued Date-Time: 2019  13:30
Notice Type: IM Discharge Notice
 
Notice Delivered To: Patient
Relationship to Patient: 
Representative Name: 
 
Delivery Method: HAND - Hand Delivered
Emili Days:
Prior Verbal Notification: 
 
Recipient Understood Notice: Yes
Recipient Signature: Yes
Med Rec Note Co-signed by Attending:
 
Coverage Notice Comment:  
Reviewer: MAVERICK Vallecillo
 
Notice Issued Date-Time: 2019  13:30
Notice Type: Patient Choice Letter
 
 
Notice Delivered To: Patient
Relationship to Patient: 
Representative Name: 
 
Delivery Method: HAND - Hand Delivered
Emili Days:
Prior Verbal Notification: 
 
Recipient Understood Notice: Yes
Recipient Signature: Yes
Med Rec Note Co-signed by Attending:
 
Coverage Notice Comment:  NO SKILLED NURSING FACILITY PREFERENCE
Reviewer: MAVERICK Vallecillo
 
Notice Issued Date-Time: 2019   9:35
Notice Type: IM Discharge Notice
 
Notice Delivered To: Patient
Relationship to Patient: 
Representative Name: 
 
Delivery Method: HAND - Hand Delivered
Emili Days:
Prior Verbal Notification: 
 
Recipient Understood Notice: Yes
Recipient Signature: Yes
Med Rec Note Co-signed by Attending:
 
Coverage Notice Comment:  
Reviewer: [email or web address] Amanda Bello
 
Notice Issued Date-Time: 2019  13:26
Notice Type: IM Discharge Notice
 
Notice Delivered To: Patient
Relationship to Patient: 
Representative Name: 
 
Delivery Method: HAND - Hand Delivered
Emili Days:
Prior Verbal Notification: 
 
Recipient Understood Notice: 
Recipient Signature: 
Med Rec Note Co-signed by Attending:
 
Coverage Notice Comment:  
Reviewer: MAVERICK Vallecillo
 
 
Notice Issued Date-Time: 2019   9:10
Notice Type: IM Discharge Notice
 
Notice Delivered To: Patient
Relationship to Patient: 
Representative Name: 
 
Delivery Method: HAND - Hand Delivered
Emili Days:
Prior Verbal Notification: 
 
Recipient Understood Notice: Yes
Recipient Signature: Yes
Med Rec Note Co-signed by Attending:
 
Coverage Notice Comment:  
 
Last DP export: 19   8:27 a
Patient Name: NEVILLE ESTEBAN
Encounter #: [id number]
Page [id number]
 
 
 
 
 
Electronically Signed by ANA TOBAR on 19 at 1409
 
 
 
 
 
 
**All edits/amendments must be made on the electronic document**
 
DICTATION DATE: 19 1408     : KERON  19 1408     
RPT#: [id number]                                DC DATE:        
                                               STATUS: ADM IN  
[facility]
 [place] [address]
***END OF REPORT***

## 2019-08-28 ENCOUNTER — HOSPITAL ENCOUNTER (INPATIENT)
Dept: HOSPITAL 84 - D.ER | Age: 79
LOS: 1 days | DRG: 951 | End: 2019-08-29
Attending: INTERNAL MEDICINE | Admitting: INTERNAL MEDICINE
Payer: COMMERCIAL

## 2019-08-28 VITALS — SYSTOLIC BLOOD PRESSURE: 103 MMHG | DIASTOLIC BLOOD PRESSURE: 62 MMHG

## 2019-08-28 VITALS — DIASTOLIC BLOOD PRESSURE: 63 MMHG | SYSTOLIC BLOOD PRESSURE: 100 MMHG

## 2019-08-28 VITALS — DIASTOLIC BLOOD PRESSURE: 72 MMHG | SYSTOLIC BLOOD PRESSURE: 105 MMHG

## 2019-08-28 VITALS — BODY MASS INDEX: 13.57 KG/M2 | HEIGHT: 72 IN | WEIGHT: 100.21 LBS

## 2019-08-28 VITALS — DIASTOLIC BLOOD PRESSURE: 60 MMHG | SYSTOLIC BLOOD PRESSURE: 92 MMHG

## 2019-08-28 DIAGNOSIS — Z51.5: Primary | ICD-10-CM

## 2019-08-28 LAB
ALBUMIN SERPL-MCNC: 2.8 G/DL (ref 3.4–5)
ALP SERPL-CCNC: 137 U/L (ref 46–116)
ALT SERPL-CCNC: 17 U/L (ref 10–68)
ANION GAP SERPL CALC-SCNC: 7.8 MMOL/L (ref 8–16)
APPEARANCE UR: CLEAR
APTT BLD: 34.7 SECONDS (ref 22.8–39.4)
BACTERIA #/AREA URNS HPF: (no result) /HPF
BILIRUB SERPL-MCNC: 1.26 MG/DL (ref 0.2–1.3)
BILIRUB SERPL-MCNC: NEGATIVE MG/DL
BUN SERPL-MCNC: 38 MG/DL (ref 7–18)
CALCIUM SERPL-MCNC: 9.7 MG/DL (ref 8.5–10.1)
CHLORIDE SERPL-SCNC: 99 MMOL/L (ref 98–107)
CK MB SERPL-MCNC: 2.9 U/L (ref 0–3.6)
CK SERPL-CCNC: 67 UL (ref 21–232)
CO2 SERPL-SCNC: 38.7 MMOL/L (ref 21–32)
COLOR UR: YELLOW
CREAT SERPL-MCNC: 0.9 MG/DL (ref 0.6–1.3)
EOSINOPHIL NFR BLD: 1 % (ref 0–7)
ERYTHROCYTE [DISTWIDTH] IN BLOOD BY AUTOMATED COUNT: 18 % (ref 11.5–14.5)
GLOBULIN SER-MCNC: 5.6 G/L
GLUCOSE SERPL-MCNC: 104 MG/DL (ref 74–106)
GLUCOSE SERPL-MCNC: NEGATIVE MG/DL
HCT VFR BLD CALC: 50.6 % (ref 42–54)
HGB BLD-MCNC: 14.8 G/DL (ref 13.5–17.5)
INR PPP: 1.35 (ref 0.85–1.17)
KETONES UR STRIP-MCNC: NEGATIVE MG/DL
LYMPHOCYTES NFR BLD AUTO: 15 % (ref 15–50)
MCH RBC QN AUTO: 26.4 PG (ref 26–34)
MCHC RBC AUTO-ENTMCNC: 29.2 G/DL (ref 31–37)
MCV RBC: 90.4 FL (ref 80–100)
MONOCYTES NFR BLD: 2 % (ref 2–11)
NEUTROPHILS NFR BLD AUTO: 82 % (ref 40–80)
NITRITE UR-MCNC: NEGATIVE MG/ML
OSMOLALITY SERPL CALC.SUM OF ELEC: 289 MOSM/KG (ref 275–300)
PH UR STRIP: 5 [PH] (ref 5–6)
PLATELET # BLD EST: NORMAL 10*3/UL
PLATELET # BLD: 321 10X3/UL (ref 130–400)
PMV BLD AUTO: 13.3 FL (ref 7.4–10.4)
POTASSIUM SERPL-SCNC: 4.5 MMOL/L (ref 3.5–5.1)
PROT SERPL-MCNC: 8.4 G/DL (ref 6.4–8.2)
PROT UR-MCNC: (no result) MG/DL
PROTHROMBIN TIME: 16.1 SECONDS (ref 11.6–15)
RBC # BLD AUTO: 5.6 10X6/UL (ref 4.2–6.1)
RBC #/AREA URNS HPF: (no result) /HPF (ref 0–5)
SODIUM SERPL-SCNC: 141 MMOL/L (ref 136–145)
SP GR UR STRIP: 1.02 (ref 1–1.02)
TROPONIN I SERPL-MCNC: 0.09 NG/ML (ref 0–0.06)
UROBILINOGEN UR-MCNC: NORMAL MG/DL
WBC # BLD AUTO: 37.1 10X3/UL (ref 4.8–10.8)
WBC #/AREA URNS HPF: (no result) /HPF (ref 0–5)

## 2019-08-29 VITALS — SYSTOLIC BLOOD PRESSURE: 100 MMHG | DIASTOLIC BLOOD PRESSURE: 63 MMHG

## 2019-08-29 VITALS — SYSTOLIC BLOOD PRESSURE: 89 MMHG | DIASTOLIC BLOOD PRESSURE: 53 MMHG

## 2019-08-29 NOTE — MORECARE
CASE MANAGEMENT DISCHARGE SUMMARY
 
 
PATIENT: NEVILLE ESTEBAN                        UNIT: [id number]
ACCOUNT#: [id number]                       ADM DATE: 19
AGE: 79     : 40  SEX: M            ROOM/BED: D.2130    
AUTHOR: ANA TOBAR                             PHYSICIAN:                               
 
REFERRING PHYSICIAN: GARRISON BECERRA MD               
DATE OF SERVICE: 19
Discharge Plan
 
 
Patient Name: NEVILLE ESTEBAN
Facility: [facility]
Encounter #: [id number]
Medical Record #: [id number]
: 1940
Planned Disposition: 
Anticipated Discharge Date: 19
 
Discharge Date: 
Expected LOS: 1
Initial Reviewer: [id number]
Initial Review Date: 2019
Generated: 19  10:11 am 
Comments
 
DCP- Discharge Planning
 
Updated by [id number]: Fiona Bolton on 19   5:21 pm CT
1815: [place] from [facility] called back. Provided patient information. 
[place] will visit patient in ER and determine if he qualifies for Inpatient 
Hospice. ER MD notified. Fiona Bolton RN CM
DCP- Discharge Planning
 
Updated by [email or web address]: Fiona Bolton on 19   5:10 pm CT
CM spoke to Karen Chen (daughter) @[phone], regarding possibility of 
Hospice. Per daughter's request, she would like to have patient placed on 
Hospice, this was verified by Dr. Marie. PCP: Dr. Mercer. Contacted [facility] call service,  provided patient's name and room number here in ER #12.
 Await CB or visit.  
Fiona Bolton RN
  
 
 
 
 
 
 
 
 
Patient Name: NEVILLE ESTEBAN
Encounter #: [id number]
Page [id number]
 
 
 
 
 
Electronically Signed by ANA TOBAR on 19 at 0912
 
 
 
 
 
 
**All edits/amendments must be made on the electronic document**
 
DICTATION DATE: 19     : KERON  19     
RPT#: [id number]                                DC DATE:        
                                               STATUS: ADM IN  
[facility]
[address] [place] [address]
***END OF REPORT***

## 2023-05-25 NOTE — NUR
PATIENT COMPLAIN OF IV HURTING. UNABLE TO FLUSH IV TO RIGHT WRIST. 20 GAUGE IV
REMOVED FROM RIGHT WRIST. NO BLEEDING FROM SITE. CATHETER TIP INTACT. 2X2
GAUZE APPLIED AND SECURED WITH TAPE.
20 GAUGE IV PLACED TO ANTERIOR FOREARM X 1 STICK. GOOD BLOOD RETURN, EASY
FLUSH. TAPED, DATED,AND SECURED. TOLERATED IV PLACMENT WELL. IV PROTONIX
INFUSING AT THIS TIME. CALL LIGHT WITHIN REACH. PATIENT THANKED THIS WRITER
FOR CORRECTING THE IV. Prednisone Counseling:  I discussed with the patient the risks of prolonged use of prednisone including but not limited to weight gain, insomnia, osteoporosis, mood changes, diabetes, susceptibility to infection, glaucoma and high blood pressure.  In cases where prednisone use is prolonged, patients should be monitored with blood pressure checks, serum glucose levels and an eye exam.  Additionally, the patient may need to be placed on GI prophylaxis, PCP prophylaxis, and calcium and vitamin D supplementation and/or a bisphosphonate.  The patient verbalized understanding of the proper use and the possible adverse effects of prednisone.  All of the patient's questions and concerns were addressed.

## 2024-04-05 NOTE — NUR
ALERT AND ORIENTED. ABDOMEN WITH ACTIVE BOWEL SOUNDS X 4. FLAT AND TENDER TO
PALPATION. REDNESS TO BOTTOM. BOUDREAUXS APPLIED. LEFT FOREARM IV THAT IS
INFUSING A PROTONIX DRIP. REFUSES POSEY ALARM. NON COMPLAINT WITH NURSING
INTERVENTIONS. STATES HE DOES NOT WANT TO TAKE CERTAIN MEDICINES AT BEDTIME
THAT ARE PRESCRIBED. 3L HF. CALL LIGHT IN REACH. Atypical chest pain